# Patient Record
Sex: FEMALE | Race: WHITE | Employment: OTHER | URBAN - METROPOLITAN AREA
[De-identification: names, ages, dates, MRNs, and addresses within clinical notes are randomized per-mention and may not be internally consistent; named-entity substitution may affect disease eponyms.]

---

## 2017-03-10 ENCOUNTER — TELEPHONE (OUTPATIENT)
Dept: INTERNAL MEDICINE CLINIC | Age: 67
End: 2017-03-10

## 2017-08-22 RX ORDER — FAMOTIDINE 40 MG/1
40 TABLET, FILM COATED ORAL 2 TIMES DAILY
Qty: 60 TABLET | Refills: 3 | Status: SHIPPED | OUTPATIENT
Start: 2017-08-22 | End: 2017-08-25 | Stop reason: ALTCHOICE

## 2017-08-25 ENCOUNTER — OFFICE VISIT (OUTPATIENT)
Dept: INTERNAL MEDICINE CLINIC | Age: 67
End: 2017-08-25

## 2017-08-25 VITALS
HEART RATE: 83 BPM | TEMPERATURE: 98 F | SYSTOLIC BLOOD PRESSURE: 138 MMHG | WEIGHT: 149 LBS | DIASTOLIC BLOOD PRESSURE: 84 MMHG | BODY MASS INDEX: 28.15 KG/M2 | OXYGEN SATURATION: 97 %

## 2017-08-25 DIAGNOSIS — K21.9 GASTROESOPHAGEAL REFLUX DISEASE WITHOUT ESOPHAGITIS: ICD-10-CM

## 2017-08-25 DIAGNOSIS — R07.9 CHEST PAIN OF UNKNOWN ETIOLOGY: Primary | ICD-10-CM

## 2017-08-25 PROCEDURE — 99214 OFFICE O/P EST MOD 30 MIN: CPT | Performed by: INTERNAL MEDICINE

## 2017-08-25 PROCEDURE — 93000 ELECTROCARDIOGRAM COMPLETE: CPT | Performed by: INTERNAL MEDICINE

## 2017-08-25 RX ORDER — OMEPRAZOLE 20 MG/1
20 CAPSULE, DELAYED RELEASE ORAL DAILY
Qty: 30 CAPSULE | Refills: 3 | Status: SHIPPED | OUTPATIENT
Start: 2017-08-25 | End: 2017-11-17 | Stop reason: SDUPTHER

## 2017-08-25 ASSESSMENT — ENCOUNTER SYMPTOMS
DIARRHEA: 0
WHEEZING: 0
ABDOMINAL PAIN: 0
SHORTNESS OF BREATH: 0
VOMITING: 0
TROUBLE SWALLOWING: 0
NAUSEA: 0
SORE THROAT: 0

## 2017-10-11 RX ORDER — FAMOTIDINE 40 MG/1
40 TABLET, FILM COATED ORAL 2 TIMES DAILY
Qty: 180 TABLET | Refills: 3 | Status: SHIPPED | OUTPATIENT
Start: 2017-10-11 | End: 2017-10-12 | Stop reason: SDUPTHER

## 2017-10-12 RX ORDER — FAMOTIDINE 40 MG/1
40 TABLET, FILM COATED ORAL 2 TIMES DAILY
Qty: 180 TABLET | Refills: 3 | Status: SHIPPED | OUTPATIENT
Start: 2017-10-12 | End: 2017-11-17

## 2017-11-17 ENCOUNTER — OFFICE VISIT (OUTPATIENT)
Dept: INTERNAL MEDICINE CLINIC | Age: 67
End: 2017-11-17

## 2017-11-17 VITALS
WEIGHT: 153 LBS | HEART RATE: 77 BPM | HEIGHT: 61 IN | TEMPERATURE: 98.3 F | DIASTOLIC BLOOD PRESSURE: 82 MMHG | BODY MASS INDEX: 28.89 KG/M2 | SYSTOLIC BLOOD PRESSURE: 138 MMHG | OXYGEN SATURATION: 97 %

## 2017-11-17 DIAGNOSIS — K21.9 GASTROESOPHAGEAL REFLUX DISEASE WITHOUT ESOPHAGITIS: ICD-10-CM

## 2017-11-17 DIAGNOSIS — G45.4 TRANSIENT GLOBAL AMNESIA: ICD-10-CM

## 2017-11-17 DIAGNOSIS — B35.1 TOENAIL FUNGUS: ICD-10-CM

## 2017-11-17 DIAGNOSIS — G45.4 TRANSIENT GLOBAL AMNESIA: Primary | ICD-10-CM

## 2017-11-17 LAB
ALBUMIN SERPL-MCNC: 4.8 G/DL (ref 3.4–5)
ALP BLD-CCNC: 74 U/L (ref 40–129)
ALT SERPL-CCNC: 16 U/L (ref 10–40)
ANION GAP SERPL CALCULATED.3IONS-SCNC: 12 MMOL/L (ref 3–16)
AST SERPL-CCNC: 18 U/L (ref 15–37)
BASOPHILS ABSOLUTE: 0.1 K/UL (ref 0–0.2)
BASOPHILS RELATIVE PERCENT: 1.1 %
BILIRUB SERPL-MCNC: <0.2 MG/DL (ref 0–1)
BILIRUBIN DIRECT: <0.2 MG/DL (ref 0–0.3)
BILIRUBIN, INDIRECT: NORMAL MG/DL (ref 0–1)
BUN BLDV-MCNC: 24 MG/DL (ref 7–20)
CALCIUM SERPL-MCNC: 9.6 MG/DL (ref 8.3–10.6)
CHLORIDE BLD-SCNC: 102 MMOL/L (ref 99–110)
CO2: 27 MMOL/L (ref 21–32)
CREAT SERPL-MCNC: 1.1 MG/DL (ref 0.6–1.2)
EOSINOPHILS ABSOLUTE: 0.5 K/UL (ref 0–0.6)
EOSINOPHILS RELATIVE PERCENT: 6.2 %
GFR AFRICAN AMERICAN: 60
GFR NON-AFRICAN AMERICAN: 49
GLUCOSE BLD-MCNC: 85 MG/DL (ref 70–99)
HCT VFR BLD CALC: 44.3 % (ref 36–48)
HEMOGLOBIN: 14.9 G/DL (ref 12–16)
LYMPHOCYTES ABSOLUTE: 2.3 K/UL (ref 1–5.1)
LYMPHOCYTES RELATIVE PERCENT: 28.4 %
MCH RBC QN AUTO: 29.7 PG (ref 26–34)
MCHC RBC AUTO-ENTMCNC: 33.6 G/DL (ref 31–36)
MCV RBC AUTO: 88.4 FL (ref 80–100)
MONOCYTES ABSOLUTE: 0.8 K/UL (ref 0–1.3)
MONOCYTES RELATIVE PERCENT: 9.5 %
NEUTROPHILS ABSOLUTE: 4.4 K/UL (ref 1.7–7.7)
NEUTROPHILS RELATIVE PERCENT: 54.8 %
PDW BLD-RTO: 13.7 % (ref 12.4–15.4)
PHOSPHORUS: 4 MG/DL (ref 2.5–4.9)
PLATELET # BLD: 237 K/UL (ref 135–450)
PMV BLD AUTO: 9.1 FL (ref 5–10.5)
POTASSIUM SERPL-SCNC: 4.6 MMOL/L (ref 3.5–5.1)
RBC # BLD: 5.02 M/UL (ref 4–5.2)
SODIUM BLD-SCNC: 141 MMOL/L (ref 136–145)
T4 FREE: 1 NG/DL (ref 0.9–1.8)
TOTAL PROTEIN: 7.4 G/DL (ref 6.4–8.2)
TSH REFLEX: 6.39 UIU/ML (ref 0.27–4.2)
VITAMIN D 25-HYDROXY: 25 NG/ML
WBC # BLD: 8.1 K/UL (ref 4–11)

## 2017-11-17 PROCEDURE — 99213 OFFICE O/P EST LOW 20 MIN: CPT | Performed by: INTERNAL MEDICINE

## 2017-11-17 RX ORDER — CICLOPIROX 7.7 MG/G
GEL TOPICAL
Qty: 1 TUBE | Refills: 0 | Status: SHIPPED | OUTPATIENT
Start: 2017-11-17 | End: 2019-05-03 | Stop reason: ALTCHOICE

## 2017-11-17 RX ORDER — OMEPRAZOLE 20 MG/1
20 CAPSULE, DELAYED RELEASE ORAL DAILY
Qty: 90 CAPSULE | Refills: 3 | Status: SHIPPED | OUTPATIENT
Start: 2017-11-17 | End: 2018-04-18 | Stop reason: SDUPTHER

## 2017-11-17 ASSESSMENT — ENCOUNTER SYMPTOMS
DIARRHEA: 0
VOMITING: 0
SHORTNESS OF BREATH: 0
NAUSEA: 0
SORE THROAT: 0
WHEEZING: 0
ABDOMINAL PAIN: 0
TROUBLE SWALLOWING: 0

## 2017-11-17 NOTE — PROGRESS NOTES
Department of Internal Medicine  Clinic Note    Date: 11/17/2017                                               Subjective/Objective:     Chief Complaint   Patient presents with    Follow-Up from Hospital     transglobal amnesia 1 mo ago, tx at [facility]. Sleeps poorly, doesnot feel all there mentally. HPI: Patient presents today for follow-up evaluation of transient global amnesia she suffered last month. Patient was at home when she suddenly became confused and unable to converse appropriately with her . She was taken to [facility] for evaluation. Her MRI and lab work were not concerning for any specific etiology. I'm not convinced that the patient did not have a TIA at that time. We will order some labs today to evaluate the patient further         Patient Active Problem List    Diagnosis Date Noted    Seborrheic keratosis 10/26/2016    Post-menopausal osteoporosis 02/22/2016    Pre-op exam 09/29/2015    Cataract 09/29/2015    Esophageal ulcer 09/29/2015    Need for pneumococcal vaccination 01/26/2015    Tinea corporis 06/11/2013    Annual physical exam 11/23/2012    Need for Tdap vaccination 11/23/2012    Hypercholesterolemia 11/08/2011    Chronic rhinitis 11/08/2011    Mild persistent asthma without complication 83/90/2124    Insomnia 11/08/2011       No past medical history on file.     Past Surgical History:   Procedure Laterality Date    CATARACT REMOVAL WITH IMPLANT Right 10/2015    Dr. Rosana Stoner       Orders Only on 10/26/2016   Component Date Value Ref Range Status    T4 Free 10/26/2016 1.1  0.9 - 1.8 ng/dL Final       Family History   Problem Relation Age of Onset    Heart Attack Mother     Diabetes Mother     Heart Attack Father     Heart Attack Brother        Current Outpatient Prescriptions   Medication Sig Dispense Refill    Ciclopirox (LOPROX) 0.77 % gel To affected toe nightly 1 Tube 0    omeprazole (PRILOSEC) 20 MG delayed release capsule Take 1 capsule by mouth daily

## 2017-11-20 RX ORDER — MELATONIN
1000 DAILY
Qty: 30 TABLET | Refills: 0 | Status: SHIPPED | OUTPATIENT
Start: 2017-11-20 | End: 2017-12-13 | Stop reason: SDUPTHER

## 2017-12-13 DIAGNOSIS — E78.00 HYPERCHOLESTEROLEMIA: ICD-10-CM

## 2017-12-13 RX ORDER — PRAVASTATIN SODIUM 40 MG
40 TABLET ORAL EVERY EVENING
Qty: 90 TABLET | Refills: 3 | Status: SHIPPED | OUTPATIENT
Start: 2017-12-13 | End: 2018-04-19 | Stop reason: SDUPTHER

## 2018-03-01 DIAGNOSIS — F51.01 PRIMARY INSOMNIA: ICD-10-CM

## 2018-03-01 RX ORDER — HYDROXYZINE PAMOATE 25 MG/1
CAPSULE ORAL
Qty: 120 CAPSULE | Refills: 3 | Status: SHIPPED | OUTPATIENT
Start: 2018-03-01 | End: 2018-04-18 | Stop reason: SDUPTHER

## 2018-04-18 DIAGNOSIS — F51.01 PRIMARY INSOMNIA: ICD-10-CM

## 2018-04-18 DIAGNOSIS — K21.9 GASTROESOPHAGEAL REFLUX DISEASE WITHOUT ESOPHAGITIS: ICD-10-CM

## 2018-04-18 RX ORDER — HYDROXYZINE PAMOATE 25 MG/1
CAPSULE ORAL
Qty: 120 CAPSULE | Refills: 3 | Status: SHIPPED | OUTPATIENT
Start: 2018-04-18 | End: 2018-04-23 | Stop reason: SDUPTHER

## 2018-04-18 RX ORDER — OMEPRAZOLE 20 MG/1
20 CAPSULE, DELAYED RELEASE ORAL DAILY
Qty: 90 CAPSULE | Refills: 3 | Status: SHIPPED | OUTPATIENT
Start: 2018-04-18 | End: 2018-04-23 | Stop reason: SDUPTHER

## 2018-04-19 DIAGNOSIS — E78.00 HYPERCHOLESTEROLEMIA: ICD-10-CM

## 2018-04-19 RX ORDER — PRAVASTATIN SODIUM 40 MG
40 TABLET ORAL EVERY EVENING
Qty: 90 TABLET | Refills: 3 | Status: SHIPPED | OUTPATIENT
Start: 2018-04-19 | End: 2018-04-23 | Stop reason: SDUPTHER

## 2018-04-23 DIAGNOSIS — F51.01 PRIMARY INSOMNIA: ICD-10-CM

## 2018-04-23 DIAGNOSIS — K21.9 GASTROESOPHAGEAL REFLUX DISEASE WITHOUT ESOPHAGITIS: ICD-10-CM

## 2018-04-23 DIAGNOSIS — E78.00 HYPERCHOLESTEROLEMIA: ICD-10-CM

## 2018-04-23 RX ORDER — OMEPRAZOLE 20 MG/1
20 CAPSULE, DELAYED RELEASE ORAL DAILY
Qty: 90 CAPSULE | Refills: 3 | Status: SHIPPED | OUTPATIENT
Start: 2018-04-23 | End: 2019-04-18 | Stop reason: SDUPTHER

## 2018-04-23 RX ORDER — HYDROXYZINE PAMOATE 25 MG/1
CAPSULE ORAL
Qty: 120 CAPSULE | Refills: 3 | Status: SHIPPED | OUTPATIENT
Start: 2018-04-23 | End: 2018-07-03 | Stop reason: SDUPTHER

## 2018-04-23 RX ORDER — PRAVASTATIN SODIUM 40 MG
40 TABLET ORAL EVERY EVENING
Qty: 90 TABLET | Refills: 3 | Status: SHIPPED | OUTPATIENT
Start: 2018-04-23 | End: 2019-05-03 | Stop reason: SDUPTHER

## 2018-04-27 ENCOUNTER — TELEPHONE (OUTPATIENT)
Dept: INTERNAL MEDICINE CLINIC | Age: 68
End: 2018-04-27

## 2018-04-27 RX ORDER — ONDANSETRON 4 MG/1
4 TABLET, FILM COATED ORAL EVERY 12 HOURS PRN
Qty: 20 TABLET | Refills: 0 | Status: SHIPPED | OUTPATIENT
Start: 2018-04-27 | End: 2019-05-03 | Stop reason: ALTCHOICE

## 2018-06-14 ENCOUNTER — TELEPHONE (OUTPATIENT)
Dept: INTERNAL MEDICINE CLINIC | Age: 68
End: 2018-06-14

## 2018-06-22 ENCOUNTER — TELEPHONE (OUTPATIENT)
Dept: INTERNAL MEDICINE CLINIC | Age: 68
End: 2018-06-22

## 2018-06-22 ENCOUNTER — TELEPHONE (OUTPATIENT)
Dept: PRIMARY CARE CLINIC | Age: 68
End: 2018-06-22

## 2018-07-03 DIAGNOSIS — F51.01 PRIMARY INSOMNIA: ICD-10-CM

## 2018-07-03 RX ORDER — HYDROXYZINE PAMOATE 25 MG/1
CAPSULE ORAL
Qty: 120 CAPSULE | Refills: 3 | Status: SHIPPED | OUTPATIENT
Start: 2018-07-03 | End: 2019-05-03 | Stop reason: SDUPTHER

## 2018-08-07 ENCOUNTER — TELEPHONE (OUTPATIENT)
Dept: INTERNAL MEDICINE CLINIC | Age: 68
End: 2018-08-07

## 2018-08-07 NOTE — TELEPHONE ENCOUNTER
Pt is asking for a script of med below. She would like a hard copy mailed to her address is on file.  She is aware Dr Wilkins Hashimoto is off  Today       JK#[phone]      ADVAIR DISKUS 100-50 MCG/DOSE diskus inhaler 1 Inhaler 5 4/26/2018     Sig - Route: INHALE 1 PUFF INTO THE LUNGS 2 TIMES DAILY - Inhalation

## 2019-05-03 DIAGNOSIS — E55.9 VITAMIN D DEFICIENCY: ICD-10-CM

## 2019-05-03 DIAGNOSIS — R73.9 HYPERGLYCEMIA: ICD-10-CM

## 2019-05-03 DIAGNOSIS — E78.2 MIXED HYPERLIPIDEMIA: ICD-10-CM

## 2019-05-03 DIAGNOSIS — E78.00 HYPERCHOLESTEROLEMIA: ICD-10-CM

## 2019-05-03 DIAGNOSIS — F51.01 PRIMARY INSOMNIA: ICD-10-CM

## 2019-05-03 LAB
ALBUMIN SERPL-MCNC: 4.6 G/DL (ref 3.4–5)
ALP BLD-CCNC: 77 U/L (ref 40–129)
ALT SERPL-CCNC: 14 U/L (ref 10–40)
ANION GAP SERPL CALCULATED.3IONS-SCNC: 14 MMOL/L (ref 3–16)
AST SERPL-CCNC: 18 U/L (ref 15–37)
BASOPHILS ABSOLUTE: 0.1 K/UL (ref 0–0.2)
BASOPHILS RELATIVE PERCENT: 0.9 %
BILIRUB SERPL-MCNC: 0.4 MG/DL (ref 0–1)
BILIRUBIN DIRECT: <0.2 MG/DL (ref 0–0.3)
BILIRUBIN, INDIRECT: NORMAL MG/DL (ref 0–1)
BUN BLDV-MCNC: 20 MG/DL (ref 7–20)
CALCIUM SERPL-MCNC: 9.8 MG/DL (ref 8.3–10.6)
CHLORIDE BLD-SCNC: 103 MMOL/L (ref 99–110)
CHOLESTEROL, TOTAL: 236 MG/DL (ref 0–199)
CO2: 26 MMOL/L (ref 21–32)
CREAT SERPL-MCNC: 1.3 MG/DL (ref 0.6–1.2)
EOSINOPHILS ABSOLUTE: 0.4 K/UL (ref 0–0.6)
EOSINOPHILS RELATIVE PERCENT: 5.2 %
GFR AFRICAN AMERICAN: 49
GFR NON-AFRICAN AMERICAN: 41
GLUCOSE BLD-MCNC: 102 MG/DL (ref 70–99)
HCT VFR BLD CALC: 47.5 % (ref 36–48)
HDLC SERPL-MCNC: 63 MG/DL (ref 40–60)
HEMOGLOBIN: 15.8 G/DL (ref 12–16)
LDL CHOLESTEROL CALCULATED: 140 MG/DL
LYMPHOCYTES ABSOLUTE: 1.6 K/UL (ref 1–5.1)
LYMPHOCYTES RELATIVE PERCENT: 21.7 %
MCH RBC QN AUTO: 29.4 PG (ref 26–34)
MCHC RBC AUTO-ENTMCNC: 33.2 G/DL (ref 31–36)
MCV RBC AUTO: 88.7 FL (ref 80–100)
MONOCYTES ABSOLUTE: 0.6 K/UL (ref 0–1.3)
MONOCYTES RELATIVE PERCENT: 8.7 %
NEUTROPHILS ABSOLUTE: 4.7 K/UL (ref 1.7–7.7)
NEUTROPHILS RELATIVE PERCENT: 63.5 %
PDW BLD-RTO: 13.3 % (ref 12.4–15.4)
PHOSPHORUS: 3.7 MG/DL (ref 2.5–4.9)
PLATELET # BLD: 247 K/UL (ref 135–450)
PMV BLD AUTO: 8.4 FL (ref 5–10.5)
POTASSIUM SERPL-SCNC: 4.6 MMOL/L (ref 3.5–5.1)
RBC # BLD: 5.36 M/UL (ref 4–5.2)
SODIUM BLD-SCNC: 143 MMOL/L (ref 136–145)
T4 FREE: 1 NG/DL (ref 0.9–1.8)
TOTAL PROTEIN: 7.9 G/DL (ref 6.4–8.2)
TRIGL SERPL-MCNC: 165 MG/DL (ref 0–150)
TSH REFLEX: 7.12 UIU/ML (ref 0.27–4.2)
VITAMIN D 25-HYDROXY: 25.2 NG/ML
VLDLC SERPL CALC-MCNC: 33 MG/DL
WBC # BLD: 7.3 K/UL (ref 4–11)

## 2019-05-04 LAB
ESTIMATED AVERAGE GLUCOSE: 137 MG/DL
HBA1C MFR BLD: 6.4 %

## 2021-06-18 DIAGNOSIS — E55.9 VITAMIN D DEFICIENCY: ICD-10-CM

## 2021-06-18 DIAGNOSIS — R73.03 PREDIABETES: ICD-10-CM

## 2021-06-18 DIAGNOSIS — E03.8 SUBCLINICAL HYPOTHYROIDISM: ICD-10-CM

## 2021-06-18 DIAGNOSIS — N18.31 STAGE 3A CHRONIC KIDNEY DISEASE (HCC): ICD-10-CM

## 2021-06-18 DIAGNOSIS — E78.00 HYPERCHOLESTEROLEMIA: ICD-10-CM

## 2021-06-18 LAB
A/G RATIO: 1.5 (ref 1.1–2.2)
ALBUMIN SERPL-MCNC: 4.7 G/DL (ref 3.4–5)
ALP BLD-CCNC: 74 U/L (ref 40–129)
ALT SERPL-CCNC: 13 U/L (ref 10–40)
ANION GAP SERPL CALCULATED.3IONS-SCNC: 12 MMOL/L (ref 3–16)
AST SERPL-CCNC: 25 U/L (ref 15–37)
BASOPHILS ABSOLUTE: 0.1 K/UL (ref 0–0.2)
BASOPHILS RELATIVE PERCENT: 1.3 %
BILIRUB SERPL-MCNC: 0.3 MG/DL (ref 0–1)
BUN BLDV-MCNC: 22 MG/DL (ref 7–20)
CALCIUM SERPL-MCNC: 9.7 MG/DL (ref 8.3–10.6)
CHLORIDE BLD-SCNC: 105 MMOL/L (ref 99–110)
CHOLESTEROL, TOTAL: 317 MG/DL (ref 0–199)
CO2: 25 MMOL/L (ref 21–32)
CREAT SERPL-MCNC: 1.2 MG/DL (ref 0.6–1.2)
EOSINOPHILS ABSOLUTE: 0.5 K/UL (ref 0–0.6)
EOSINOPHILS RELATIVE PERCENT: 6.5 %
GFR AFRICAN AMERICAN: 54
GFR NON-AFRICAN AMERICAN: 44
GLOBULIN: 3.2 G/DL
GLUCOSE BLD-MCNC: 75 MG/DL (ref 70–99)
HCT VFR BLD CALC: 44.2 % (ref 36–48)
HDLC SERPL-MCNC: 57 MG/DL (ref 40–60)
HEMOGLOBIN: 15 G/DL (ref 12–16)
LDL CHOLESTEROL CALCULATED: 202 MG/DL
LYMPHOCYTES ABSOLUTE: 2 K/UL (ref 1–5.1)
LYMPHOCYTES RELATIVE PERCENT: 27.6 %
MCH RBC QN AUTO: 29.6 PG (ref 26–34)
MCHC RBC AUTO-ENTMCNC: 33.9 G/DL (ref 31–36)
MCV RBC AUTO: 87.1 FL (ref 80–100)
MONOCYTES ABSOLUTE: 0.8 K/UL (ref 0–1.3)
MONOCYTES RELATIVE PERCENT: 10.7 %
NEUTROPHILS ABSOLUTE: 3.9 K/UL (ref 1.7–7.7)
NEUTROPHILS RELATIVE PERCENT: 53.9 %
PDW BLD-RTO: 14.1 % (ref 12.4–15.4)
PLATELET # BLD: 250 K/UL (ref 135–450)
PMV BLD AUTO: 8.4 FL (ref 5–10.5)
POTASSIUM SERPL-SCNC: 4.4 MMOL/L (ref 3.5–5.1)
RBC # BLD: 5.07 M/UL (ref 4–5.2)
SODIUM BLD-SCNC: 142 MMOL/L (ref 136–145)
T4 FREE: 0.9 NG/DL (ref 0.9–1.8)
TOTAL PROTEIN: 7.9 G/DL (ref 6.4–8.2)
TRIGL SERPL-MCNC: 290 MG/DL (ref 0–150)
TSH REFLEX FT4: 8.13 UIU/ML (ref 0.27–4.2)
VITAMIN D 25-HYDROXY: 54.1 NG/ML
VLDLC SERPL CALC-MCNC: 58 MG/DL
WBC # BLD: 7.3 K/UL (ref 4–11)

## 2021-06-19 LAB
ESTIMATED AVERAGE GLUCOSE: 116.9 MG/DL
HBA1C MFR BLD: 5.7 %

## 2021-08-03 DIAGNOSIS — E78.00 HYPERCHOLESTEROLEMIA: ICD-10-CM

## 2021-08-03 DIAGNOSIS — E03.8 SUBCLINICAL HYPOTHYROIDISM: ICD-10-CM

## 2021-08-03 LAB
CHOLESTEROL, TOTAL: 264 MG/DL (ref 0–199)
HDLC SERPL-MCNC: 58 MG/DL (ref 40–60)
LDL CHOLESTEROL CALCULATED: 170 MG/DL
T4 FREE: 1.2 NG/DL (ref 0.9–1.8)
TRIGL SERPL-MCNC: 182 MG/DL (ref 0–150)
TSH REFLEX FT4: 5.82 UIU/ML (ref 0.27–4.2)
VLDLC SERPL CALC-MCNC: 36 MG/DL

## 2021-12-03 ENCOUNTER — OFFICE VISIT (OUTPATIENT)
Dept: INTERNAL MEDICINE CLINIC | Age: 71
End: 2021-12-03
Payer: MEDICARE

## 2021-12-03 VITALS
BODY MASS INDEX: 27.42 KG/M2 | SYSTOLIC BLOOD PRESSURE: 138 MMHG | OXYGEN SATURATION: 96 % | WEIGHT: 149 LBS | HEART RATE: 83 BPM | DIASTOLIC BLOOD PRESSURE: 89 MMHG | HEIGHT: 62 IN

## 2021-12-03 DIAGNOSIS — J45.30 MILD PERSISTENT ASTHMA WITHOUT COMPLICATION: Primary | ICD-10-CM

## 2021-12-03 DIAGNOSIS — E03.8 SUBCLINICAL HYPOTHYROIDISM: ICD-10-CM

## 2021-12-03 DIAGNOSIS — Z12.31 SCREENING MAMMOGRAM FOR BREAST CANCER: ICD-10-CM

## 2021-12-03 PROBLEM — H26.491 POSTERIOR CAPSULAR OPACIFICATION, RIGHT: Status: ACTIVE | Noted: 2021-06-29

## 2021-12-03 PROCEDURE — 4040F PNEUMOC VAC/ADMIN/RCVD: CPT | Performed by: FAMILY MEDICINE

## 2021-12-03 PROCEDURE — G8400 PT W/DXA NO RESULTS DOC: HCPCS | Performed by: FAMILY MEDICINE

## 2021-12-03 PROCEDURE — G8417 CALC BMI ABV UP PARAM F/U: HCPCS | Performed by: FAMILY MEDICINE

## 2021-12-03 PROCEDURE — 1123F ACP DISCUSS/DSCN MKR DOCD: CPT | Performed by: FAMILY MEDICINE

## 2021-12-03 PROCEDURE — 1036F TOBACCO NON-USER: CPT | Performed by: FAMILY MEDICINE

## 2021-12-03 PROCEDURE — G8482 FLU IMMUNIZE ORDER/ADMIN: HCPCS | Performed by: FAMILY MEDICINE

## 2021-12-03 PROCEDURE — 3017F COLORECTAL CA SCREEN DOC REV: CPT | Performed by: FAMILY MEDICINE

## 2021-12-03 PROCEDURE — 1090F PRES/ABSN URINE INCON ASSESS: CPT | Performed by: FAMILY MEDICINE

## 2021-12-03 PROCEDURE — G8427 DOCREV CUR MEDS BY ELIG CLIN: HCPCS | Performed by: FAMILY MEDICINE

## 2021-12-03 PROCEDURE — 99204 OFFICE O/P NEW MOD 45 MIN: CPT | Performed by: FAMILY MEDICINE

## 2021-12-03 RX ORDER — LEVOTHYROXINE SODIUM 0.05 MG/1
50 TABLET ORAL DAILY
Qty: 30 TABLET | Refills: 5 | Status: SHIPPED | OUTPATIENT
Start: 2021-12-03 | End: 2022-05-09 | Stop reason: ALTCHOICE

## 2021-12-03 ASSESSMENT — ENCOUNTER SYMPTOMS
SHORTNESS OF BREATH: 0
DIARRHEA: 0
CONSTIPATION: 0
COUGH: 0
VOMITING: 0
NAUSEA: 0

## 2021-12-03 NOTE — PROGRESS NOTES
Martine Canales (:  1950) is a 69 y.o. female,New patient, here for evaluation of the following chief complaint(s):  New Patient (wants paper script for advair only. )      SUBJECTIVE:  Asthma-currently using Advair and albuterol. mild shortness of breath with exercising but improved with albuterol prn     Vit D deficiency- daily vitamin D supplement      HLD-   Last lipid panel in 2020: , Trig 178, HDL 61, Total 329. She has been unable to tolerate statins. (Pravastatin gave her insomnia, atorvastatin gave her acid reflux). She admits to being averse to medications and worries about side effects.      GERD-omeprazole 20 mg daily     CKD3- baseline Scr 1.1. Has some urinary frequency but otherwise no  complaints     Pre-diabetes- hemoglobin a1c of 6.4 in May 2019.  She is very averse to taking medications.     Subclinical hypothyroidism - last TSH 7.97 as of 2020. C/O some generalized brain fog but otherwise denies constipation, skin dryness, hair loss, weight gain         Working at [employer] part time, thinks more fatigued from this. Has to get up and urinate multiple times per night, at least once but then gets up for the dog too. I have reviewed the chart notes available from myself and other providers. I have reviewed and addressed all active problems and created or updated the problems list in detail, as needed    I have extensively reviewed and reconciled the medication list, discontinued medications not taking or no longer appropriate, and updated the active meds list    OBJECTIVE:  Review of Systems   Constitutional: Negative for chills and fever. Respiratory: Negative for cough and shortness of breath. Cardiovascular: Negative for leg swelling. Gastrointestinal: Negative for constipation, diarrhea, nausea and vomiting. Endocrine: Negative for polyuria. Genitourinary: Negative for frequency. Skin: Negative for rash.        Vitals:    21 1052   BP: 138/89   Pulse: 83   SpO2: 96%   Weight: 149 lb (67.6 kg)   Height: 5' 2\" (1.575 m)      Body mass index is 27.25 kg/m². Physical Exam  Constitutional:       Appearance: Normal appearance. Cardiovascular:      Rate and Rhythm: Normal rate and regular rhythm. Pulses: Normal pulses. Heart sounds: Normal heart sounds. Pulmonary:      Effort: Pulmonary effort is normal.      Breath sounds: Normal breath sounds. Musculoskeletal:      Right lower leg: No edema. Left lower leg: No edema. Neurological:      General: No focal deficit present. Mental Status: She is alert. Mental status is at baseline.            Lab Results   Component Value Date    LABA1C 5.7 06/18/2021     Lab Results   Component Value Date    WBC 7.3 06/18/2021    HGB 15.0 06/18/2021    HCT 44.2 06/18/2021    MCV 87.1 06/18/2021     06/18/2021      Lab Results   Component Value Date     06/18/2021    K 4.4 06/18/2021     06/18/2021    CO2 25 06/18/2021    BUN 22 06/18/2021    CREATININE 1.2 06/18/2021    GLUCOSE 75 06/18/2021    CALCIUM 9.7 06/18/2021       Lab Results   Component Value Date    CHOL 264 (H) 08/03/2021    CHOL 317 (H) 06/18/2021    CHOL 329 (H) 06/19/2020     Lab Results   Component Value Date    TRIG 182 (H) 08/03/2021    TRIG 290 (H) 06/18/2021    TRIG 178 (H) 06/19/2020     Lab Results   Component Value Date    HDL 58 08/03/2021    HDL 57 06/18/2021    HDL 61 (H) 06/19/2020     Lab Results   Component Value Date    LDLCALC 170 (H) 08/03/2021    LDLCALC 202 (H) 06/18/2021    LDLCALC 232 (H) 06/19/2020     Lab Results   Component Value Date    LABVLDL 36 08/03/2021    LABVLDL 58 06/18/2021    LABVLDL 36 06/19/2020     No results found for: CHOLHDLRATIO     The 10-year ASCVD risk score (Melody Lau et al., 2013) is: 12.9%    Values used to calculate the score:      Age: 69 years      Sex: Female      Is Non- : No      Diabetic: No      Tobacco smoker: No Systolic Blood Pressure: 652 mmHg      Is BP treated: No      HDL Cholesterol: 58 mg/dL      Total Cholesterol: 264 mg/dL     Patient received counseling and, if relevant, printed instructions for all symptoms listed in CC and HPI, as well as for all diagnoses brought onto today's visit note below. Typical counseling includes, but is not limited to, non-pharmacologic measures to manage listed symptoms and conditions; appropriate use, risks and benefits for all prescribed medications; potential interactions between medications both prescribed and OTC; diet; exercise; healthy behaviors; and goalsetting to improve health. Patient or responsible party was involved in shared decision making and had opportunity to have all questions answered. Except as noted below, all chronic problems have been reviewed and are stable to continue medications or other therapy as previously documented in the patient's chart, with changes per orders or documentation below:    1. Mild persistent asthma without complication  -     fluticasone-salmeterol (ADVAIR DISKUS) 100-50 MCG/DOSE diskus inhaler; Inhale 1 puff into the lungs 2 times daily, Disp-1 each, R-5Print  2. Subclinical hypothyroidism  -     levothyroxine (SYNTHROID) 50 MCG tablet; Take 1 tablet by mouth daily, Disp-30 tablet, R-5Normal  3. Screening mammogram for breast cancer  -     [facility] DIGITAL SCREENING AUGMENTED BILATERAL;  Future          Problem List        Unprioritized    Mild persistent asthma without complication - Primary (Chronic)    Relevant Medications    albuterol sulfate HFA (VENTOLIN HFA) 108 (90 Base) MCG/ACT inhaler    fluticasone-salmeterol (ADVAIR DISKUS) 100-50 MCG/DOSE diskus inhaler        Orders Placed This Encounter   Procedures    [facility] DIGITAL SCREENING AUGMENTED BILATERAL     Standing Status:   Future     Standing Expiration Date:   2/3/2023     Order Specific Question:   Reason for exam:     Answer:   screen for br ca       Return in about 5 months (around 5/3/2022) for AWV, Friday works best.          [facility] - Internal Medicine and Pediatrics  Dr. Jamey Barajas D.O.  - Family Medicine and T      Usual doctor's hours are:     Monday 7:30 am to 6:00 pm           Tuesday  7:30 am to 5:00 pm                                               Wednesday 7:30 am to 5:00 pm                                               Thursday 7:30 am to 5:00 pm                                               Friday 7:30 am to 4:00 pm           Saturdays, Sundays, and after hours: E-Visits are available    We observe most federal holidays and Good Friday. We ask that you only contact the office one time per issue or question, and please allow one full business day for a call back. Calling us back multiple times keeps us from being able to complete the work efficiently for you and our other patients. For medication renewals, please call your pharmacist to contact us, and be sure to allow at least 3 business days for processing before you need to  your medication. If you are sick or need an appointment that hasn't been planned, same day appointments are available every day the office is open: Monday, Tuesday, Wednesday, Thursday, and Friday. Call during office hours to schedule, even if it may not be with your regular physician. You may also call the office after 8 am on office days if you need to be seen from an issue the night before. During hours when the office is not normally open, your call will go to the messaging service which cannot provide any service other than paging the doctor. No prescriptions or other nonurgent matters will be handled and no voicemail is available, so please call back during office hours for these matters.        Electronically signed by Jamey Barajas DO on 12/3/2021 at 11:21 AM.

## 2022-01-10 DIAGNOSIS — E78.00 HYPERCHOLESTEROLEMIA: ICD-10-CM

## 2022-01-10 RX ORDER — EZETIMIBE 10 MG/1
10 TABLET ORAL DAILY
Qty: 90 TABLET | Refills: 1 | Status: SHIPPED | OUTPATIENT
Start: 2022-01-10 | End: 2022-07-25 | Stop reason: SDUPTHER

## 2022-01-10 NOTE — TELEPHONE ENCOUNTER
ROS:  Constitutional: no weight loss, fever, night sweats  Eyes: negative  Ears/Nose/Throat/Mouth: negative  Respiratory: negative  Cardiovascular: negative  Gastrointestinal: negative  Skin: negative  Musculoskeletal: negative  Neurological: negative  Endocrine:  negative  Hematologic/Lymphatic: negative  Psychologic: negative Requested Prescriptions     Pending Prescriptions Disp Refills    ezetimibe (ZETIA) 10 MG tablet 90 tablet 1     Sig: Take 1 tablet by mouth daily       Patient requesting a medication refill.   Last filled on: n/a  [facility]  Next office visit: 5/4/2022  Last regular office visit: 12/3/2021

## 2022-03-11 ENCOUNTER — HOSPITAL ENCOUNTER (OUTPATIENT)
Dept: WOMENS IMAGING | Age: 72
Discharge: HOME | End: 2022-03-11
Payer: MEDICARE

## 2022-03-11 DIAGNOSIS — R92.8 ABNORMALITY OF LEFT BREAST ON SCREENING MAMMOGRAM: Primary | ICD-10-CM

## 2022-03-11 DIAGNOSIS — Z12.31 SCREENING MAMMOGRAM FOR BREAST CANCER: ICD-10-CM

## 2022-03-11 PROCEDURE — 77067 SCR MAMMO BI INCL CAD: CPT

## 2022-03-23 ENCOUNTER — TELEPHONE (OUTPATIENT)
Dept: INTERNAL MEDICINE CLINIC | Age: 72
End: 2022-03-23

## 2022-03-23 DIAGNOSIS — F51.01 PRIMARY INSOMNIA: ICD-10-CM

## 2022-03-23 RX ORDER — HYDROXYZINE PAMOATE 25 MG/1
CAPSULE ORAL
Qty: 120 CAPSULE | Refills: 0 | Status: SHIPPED | OUTPATIENT
Start: 2022-03-23 | End: 2022-07-25 | Stop reason: SDUPTHER

## 2022-03-23 NOTE — TELEPHONE ENCOUNTER
Patient needs refill on Hydroxyzine patient uses [facility] [place], [phone] is the best number to reach the patient if there are questions

## 2022-03-23 NOTE — TELEPHONE ENCOUNTER
Requested Prescriptions     Pending Prescriptions Disp Refills    hydrOXYzine (VISTARIL) 25 MG capsule 120 capsule 0     Sig: TAKE ONE CAPSULE BY MOUTH FOUR TIMES A DAY     Patient requesting a medication refill.     Next office visit: 5/2/2022    Last regular office visit: 12/3/2021

## 2022-04-13 ENCOUNTER — HOSPITAL ENCOUNTER (OUTPATIENT)
Dept: WOMENS IMAGING | Age: 72
Discharge: HOME | End: 2022-04-13
Payer: MEDICARE

## 2022-04-13 ENCOUNTER — APPOINTMENT (OUTPATIENT)
Dept: ULTRASOUND IMAGING | Age: 72
End: 2022-04-13
Payer: MEDICARE

## 2022-04-13 DIAGNOSIS — R92.8 ABNORMALITY OF LEFT BREAST ON SCREENING MAMMOGRAM: ICD-10-CM

## 2022-04-13 PROCEDURE — G0279 TOMOSYNTHESIS, MAMMO: HCPCS

## 2022-05-02 ENCOUNTER — OFFICE VISIT (OUTPATIENT)
Dept: INTERNAL MEDICINE CLINIC | Age: 72
End: 2022-05-02
Payer: MEDICARE

## 2022-05-02 VITALS
WEIGHT: 148 LBS | OXYGEN SATURATION: 95 % | HEART RATE: 76 BPM | SYSTOLIC BLOOD PRESSURE: 155 MMHG | DIASTOLIC BLOOD PRESSURE: 90 MMHG | BODY MASS INDEX: 27.07 KG/M2

## 2022-05-02 DIAGNOSIS — E78.00 HYPERCHOLESTEROLEMIA: ICD-10-CM

## 2022-05-02 DIAGNOSIS — L82.1 SEBORRHEIC KERATOSES: ICD-10-CM

## 2022-05-02 DIAGNOSIS — H33.21 RIGHT RETINAL DETACHMENT: Primary | ICD-10-CM

## 2022-05-02 DIAGNOSIS — Z78.9 STATIN INTOLERANCE: ICD-10-CM

## 2022-05-02 DIAGNOSIS — M19.012 ARTHRITIS OF LEFT SHOULDER REGION: ICD-10-CM

## 2022-05-02 DIAGNOSIS — E55.9 VITAMIN D DEFICIENCY: ICD-10-CM

## 2022-05-02 DIAGNOSIS — Z00.00 INITIAL MEDICARE ANNUAL WELLNESS VISIT: Primary | ICD-10-CM

## 2022-05-02 DIAGNOSIS — Z00.00 MEDICARE ANNUAL WELLNESS VISIT, SUBSEQUENT: ICD-10-CM

## 2022-05-02 DIAGNOSIS — R73.03 PREDIABETES: ICD-10-CM

## 2022-05-02 DIAGNOSIS — N18.32 STAGE 3B CHRONIC KIDNEY DISEASE (HCC): ICD-10-CM

## 2022-05-02 DIAGNOSIS — E03.8 SUBCLINICAL HYPOTHYROIDISM: ICD-10-CM

## 2022-05-02 PROBLEM — N18.30 CHRONIC RENAL DISEASE, STAGE III (HCC): Status: ACTIVE | Noted: 2022-05-02

## 2022-05-02 PROBLEM — N18.31 STAGE 3A CHRONIC KIDNEY DISEASE (HCC): Status: ACTIVE | Noted: 2022-05-02

## 2022-05-02 LAB
ANION GAP SERPL CALCULATED.3IONS-SCNC: 12 MMOL/L (ref 3–16)
BUN BLDV-MCNC: 18 MG/DL (ref 7–20)
CALCIUM SERPL-MCNC: 9.7 MG/DL (ref 8.3–10.6)
CHLORIDE BLD-SCNC: 103 MMOL/L (ref 99–110)
CHOLESTEROL, TOTAL: 282 MG/DL (ref 0–199)
CO2: 25 MMOL/L (ref 21–32)
CREAT SERPL-MCNC: 1.2 MG/DL (ref 0.6–1.2)
GFR AFRICAN AMERICAN: 53
GFR NON-AFRICAN AMERICAN: 44
GLUCOSE BLD-MCNC: 94 MG/DL (ref 70–99)
HDLC SERPL-MCNC: 57 MG/DL (ref 40–60)
LDL CHOLESTEROL CALCULATED: 183 MG/DL
POTASSIUM REFLEX MAGNESIUM: 4.4 MMOL/L (ref 3.5–5.1)
SODIUM BLD-SCNC: 140 MMOL/L (ref 136–145)
T4 FREE: 1.3 NG/DL (ref 0.9–1.8)
TRIGL SERPL-MCNC: 210 MG/DL (ref 0–150)
TSH REFLEX FT4: 8.58 UIU/ML (ref 0.27–4.2)
VLDLC SERPL CALC-MCNC: 42 MG/DL

## 2022-05-02 PROCEDURE — G0438 PPPS, INITIAL VISIT: HCPCS | Performed by: FAMILY MEDICINE

## 2022-05-02 PROCEDURE — 1123F ACP DISCUSS/DSCN MKR DOCD: CPT | Performed by: FAMILY MEDICINE

## 2022-05-02 PROCEDURE — 99214 OFFICE O/P EST MOD 30 MIN: CPT | Performed by: FAMILY MEDICINE

## 2022-05-02 PROCEDURE — 3017F COLORECTAL CA SCREEN DOC REV: CPT | Performed by: FAMILY MEDICINE

## 2022-05-02 PROCEDURE — G8427 DOCREV CUR MEDS BY ELIG CLIN: HCPCS | Performed by: FAMILY MEDICINE

## 2022-05-02 PROCEDURE — 4040F PNEUMOC VAC/ADMIN/RCVD: CPT | Performed by: FAMILY MEDICINE

## 2022-05-02 PROCEDURE — 1090F PRES/ABSN URINE INCON ASSESS: CPT | Performed by: FAMILY MEDICINE

## 2022-05-02 PROCEDURE — G8400 PT W/DXA NO RESULTS DOC: HCPCS | Performed by: FAMILY MEDICINE

## 2022-05-02 PROCEDURE — G8417 CALC BMI ABV UP PARAM F/U: HCPCS | Performed by: FAMILY MEDICINE

## 2022-05-02 PROCEDURE — 1036F TOBACCO NON-USER: CPT | Performed by: FAMILY MEDICINE

## 2022-05-02 ASSESSMENT — ENCOUNTER SYMPTOMS
COUGH: 0
SHORTNESS OF BREATH: 0
CONSTIPATION: 0
VOMITING: 0
NAUSEA: 0
DIARRHEA: 0

## 2022-05-02 ASSESSMENT — PATIENT HEALTH QUESTIONNAIRE - PHQ9
1. LITTLE INTEREST OR PLEASURE IN DOING THINGS: 0
2. FEELING DOWN, DEPRESSED OR HOPELESS: 1
SUM OF ALL RESPONSES TO PHQ QUESTIONS 1-9: 1
SUM OF ALL RESPONSES TO PHQ QUESTIONS 1-9: 1
SUM OF ALL RESPONSES TO PHQ9 QUESTIONS 1 & 2: 1
SUM OF ALL RESPONSES TO PHQ QUESTIONS 1-9: 1
SUM OF ALL RESPONSES TO PHQ QUESTIONS 1-9: 1

## 2022-05-02 ASSESSMENT — LIFESTYLE VARIABLES
HOW MANY STANDARD DRINKS CONTAINING ALCOHOL DO YOU HAVE ON A TYPICAL DAY: 1 OR 2
HOW OFTEN DO YOU HAVE A DRINK CONTAINING ALCOHOL: 2-3 TIMES A WEEK

## 2022-05-02 NOTE — PROGRESS NOTES
Seema Canales (:  1950) is a 66 y.o. female,Established patient, here for evaluation of the following chief complaint(s):  Follow-up (5 month )      SUBJECTIVE:  22 -   Went to ophthalmology on 22 -- retinal detachment od (right); macula on emergent referral to retina service -- she ended up having surgery on 22 emergently   Did not need sclera banding, did have to keep head down for 3 days, but so far she has seen them twice between that surgery and now. She is getting to the point where she has the gas bubble still present in her vision, but is able to see a little bit more than before. Not driving. Would like skin referral for skin check -- will refer to dermatology of the [facility].     Does have some left shoulder arthritis, worked as a  for 20 years. Not bothering her enough for daily preventative/treatment, occasional tylenol PRN. Will get medicare annual wellness examination today      21 -- Asthma-currently using Advair and albuterol. mild shortness of breath with exercising but improved with albuterol prn     Vit D deficiency- daily vitamin D supplement      HLD-   Last lipid panel in 2020: , Trig 178, HDL 61, Total 329. She has been unable to tolerate statins. (Pravastatin gave her insomnia, atorvastatin gave her acid reflux). She admits to being averse to medications and worries about side effects.      GERD-omeprazole 20 mg daily     CKD3- baseline Scr 1.1. Has some urinary frequency but otherwise no  complaints     Pre-diabetes- hemoglobin a1c of 6.4 in May 2019.  She is very averse to taking medications.     Subclinical hypothyroidism - last TSH 7.97 as of 2020. C/O some generalized brain fog but otherwise denies constipation, skin dryness, hair loss, weight gain           I have reviewed the chart notes available from myself and other providers.  I have reviewed and addressed all active problems and created or updated the problems list in detail, as needed    I have extensively reviewed and reconciled the medication list, discontinued medications not taking or no longer appropriate, and updated the active meds list    OBJECTIVE:  Review of Systems   Constitutional: Negative for chills and fever. Respiratory: Negative for cough and shortness of breath. Cardiovascular: Negative for leg swelling. Gastrointestinal: Negative for constipation, diarrhea, nausea and vomiting. Endocrine: Negative for polyuria. Genitourinary: Negative for frequency. Skin: Negative for rash. Vitals:    05/02/22 1107   BP: (!) 155/90   Pulse: 76   SpO2: 95%   Weight: 148 lb (67.1 kg)      Body mass index is 27.07 kg/m². Physical Exam  Constitutional:       Appearance: Normal appearance. Cardiovascular:      Rate and Rhythm: Normal rate and regular rhythm. Pulses: Normal pulses. Heart sounds: Normal heart sounds. Pulmonary:      Effort: Pulmonary effort is normal.      Breath sounds: Normal breath sounds. Musculoskeletal:         General: Tenderness: left A/C joint but full ROM. Normal range of motion. Right lower leg: No edema. Left lower leg: No edema. Neurological:      General: No focal deficit present. Mental Status: She is alert. Mental status is at baseline.            Lab Results   Component Value Date    LABA1C 5.7 06/18/2021     Lab Results   Component Value Date    WBC 7.3 06/18/2021    HGB 15.0 06/18/2021    HCT 44.2 06/18/2021    MCV 87.1 06/18/2021     06/18/2021      Lab Results   Component Value Date     06/18/2021    K 4.4 06/18/2021     06/18/2021    CO2 25 06/18/2021    BUN 22 06/18/2021    CREATININE 1.2 06/18/2021    GLUCOSE 75 06/18/2021    CALCIUM 9.7 06/18/2021       Lab Results   Component Value Date    CHOL 264 (H) 08/03/2021    CHOL 317 (H) 06/18/2021    CHOL 329 (H) 06/19/2020     Lab Results   Component Value Date    TRIG 182 (H) 08/03/2021    TRIG 290 (H) 06/18/2021    TRIG 178 (H) 06/19/2020     Lab Results   Component Value Date    HDL 58 08/03/2021    HDL 57 06/18/2021    HDL 61 (H) 06/19/2020     Lab Results   Component Value Date    LDLCALC 170 (H) 08/03/2021    LDLCALC 202 (H) 06/18/2021    LDLCALC 232 (H) 06/19/2020     Lab Results   Component Value Date    LABVLDL 36 08/03/2021    LABVLDL 58 06/18/2021    LABVLDL 36 06/19/2020     No results found for: CHOLHDLRATIO     The 10-year ASCVD risk score (Reese Davis et al., 2013) is: 17.5%    Values used to calculate the score:      Age: 66 years      Sex: Female      Is Non- : No      Diabetic: No      Tobacco smoker: No      Systolic Blood Pressure: 403 mmHg      Is BP treated: No      HDL Cholesterol: 58 mg/dL      Total Cholesterol: 264 mg/dL     Patient received counseling and, if relevant, printed instructions for all symptoms listed in CC and HPI, as well as for all diagnoses brought onto today's visit note below. Typical counseling includes, but is not limited to, non-pharmacologic measures to manage listed symptoms and conditions; appropriate use, risks and benefits for all prescribed medications; potential interactions between medications both prescribed and OTC; diet; exercise; healthy behaviors; and goalsetting to improve health. Patient or responsible party was involved in shared decision making and had opportunity to have all questions answered. Except as noted below, all chronic problems have been reviewed and are stable to continue medications or other therapy as previously documented in the patient's chart, with changes per orders or documentation below:    1. Right retinal detachment  2. Stage 3b chronic kidney disease ([address]  -     Basic Metabolic Panel w/ Reflex to MG; Future  3. Prediabetes  -     Hemoglobin A1C; Future  4. Vitamin D deficiency  5. Seborrheic keratoses  -     AFL - Agnes Rayo MD, Dermatology, [place]  6.  Hypercholesterolemia  -     Lipid Panel; Future  7. Statin intolerance  -     Lipid Panel; Future  8. Subclinical hypothyroidism  -     TSH with Reflex to FT4; Future  9. Arthritis of left shoulder region          Problem List        Medium    Chronic renal disease, stage III ([facility] [address] [[id number]]    Relevant Orders    Basic Metabolic Panel w/ Reflex to MG    Right retinal detachment - Primary       Unprioritized    Hypercholesterolemia    Relevant Medications    aspirin EC 81 MG EC tablet    ezetimibe (ZETIA) 10 MG tablet    Other Relevant Orders    Lipid Panel        Orders Placed This Encounter   Procedures    Lipid Panel     Standing Status:   Future     Number of Occurrences:   1     Standing Expiration Date:   5/2/2023     Order Specific Question:   Is Patient Fasting?/# of Hours     Answer:   y    TSH with Reflex to FT4     Standing Status:   Future     Number of Occurrences:   1     Standing Expiration Date:   5/2/2023    Basic Metabolic Panel w/ Reflex to MG     Standing Status:   Future     Number of Occurrences:   1     Standing Expiration Date:   5/2/2023    Hemoglobin A1C     Standing Status:   Future     Number of Occurrences:   1     Standing Expiration Date:   5/2/2023    HAFSA - Gato Mancilla MD, Dermatology, [facility]     Referral Priority:   Routine     Referral Type:   Eval and Treat     Referral Reason:   Specialty Services Required     Referred to Provider:   Sonia Ortiz     Requested Specialty:   Dermatology     Number of Visits Requested:   1       Return in about 6 months (around 11/2/2022) for chronic conditions - 30 min visit (extended) .           [facility] - Internal Medicine and Pediatrics  Dr. Roberth Simms D.O.  - Family Medicine and T      Usual doctor's hours are:     Monday 7:30 am to 6:00 pm           Tuesday  7:30 am to 5:00 pm                                               Wednesday 7:30 am to 5:00 pm                                               Thursday 7:30 am to 5:00 pm Friday 7:30 am to 4:00 pm           Saturdays, Sundays, and after hours: E-Visits are available    We observe most federal holidays and Good Friday. We ask that you only contact the office one time per issue or question, and please allow one full business day for a call back. Calling us back multiple times keeps us from being able to complete the work efficiently for you and our other patients. For medication renewals, please call your pharmacist to contact us, and be sure to allow at least 3 business days for processing before you need to  your medication. If you are sick or need an appointment that hasn't been planned, same day appointments are available every day the office is open: Monday, Tuesday, Wednesday, Thursday, and Friday. Call during office hours to schedule, even if it may not be with your regular physician. You may also call the office after 8 am on office days if you need to be seen from an issue the night before. During hours when the office is not normally open, your call will go to the messaging service which cannot provide any service other than paging the doctor. No prescriptions or other nonurgent matters will be handled and no voicemail is available, so please call back during office hours for these matters.        Electronically signed by Sumit Batista DO on 5/2/2022 at 12:48 PM.

## 2022-05-03 LAB
ESTIMATED AVERAGE GLUCOSE: 119.8 MG/DL
HBA1C MFR BLD: 5.8 %

## 2022-05-06 NOTE — PROGRESS NOTES
Medicare Annual Wellness Visit    Valeri Molina is here for Medicare AWV    Assessment & Plan   Initial Medicare annual wellness visit  Medicare annual wellness visit, subsequent      Recommendations for Preventive Services Due: see orders and patient instructions/AVS.  Recommended screening schedule for the next 5-10 years is provided to the patient in written form: see Patient Instructions/AVS.     Return for Medicare Annual Wellness Visit in 1 year. Subjective   The following acute and/or chronic problems were also addressed today:  none    Patient's complete Health Risk Assessment and screening values have been reviewed and are found in Flowsheets. The following problems were reviewed today and where indicated follow up appointments were made and/or referrals ordered. Positive Risk Factor Screenings with Interventions:               General Health and ACP:  General  In general, how would you say your health is?: Very Good  In the past 7 days, have you experienced any of the following: New or Increased Pain, New or Increased Fatigue, Loneliness, Social Isolation, Stress or Anger?: (!) Yes  Select all that apply: (!) New or Increased Pain  Do you get the social and emotional support that you need?: Yes  Do you have a Living Will?: Yes    Advance Directives     Power of  Living Will ACP-Advance Directive ACP-Power of     Not on File Not on File Not on File Not on File      General Health Risk Interventions:  · Increased pain discussed with physician at separate visit today              Objective   There were no vitals filed for this visit. There is no height or weight on file to calculate BMI. Allergies   Allergen Reactions    Famotidine Other (See Comments)     chills    Statins      Prior to Visit Medications    Medication Sig Taking?  Authorizing Provider   hydrOXYzine (VISTARIL) 25 MG capsule TAKE ONE CAPSULE BY MOUTH FOUR TIMES A DAY  Ethan Gonzales, DO ezetimibe (ZETIA) 10 MG tablet Take 1 tablet by mouth daily  Paty Chapman DO   fluticasone-salmeterol (ADVAIR DISKUS) 100-50 MCG/DOSE diskus inhaler Inhale 1 puff into the lungs 2 times daily  Paty Chapman DO   levothyroxine (SYNTHROID) 50 MCG tablet Take 1 tablet by mouth daily  Paty Chapman DO   omeprazole (PRILOSEC) 20 MG delayed release capsule TAKE ONE CAPSULE BY MOUTH DAILY  Rina Moon DO   albuterol sulfate HFA (VENTOLIN HFA) 108 (90 Base) MCG/ACT inhaler Inhale 2 puffs into the lungs 4 times daily as needed for Wheezing  Rina Moon DO   vitamin D (VITAMIN D3 HIGH POTENCY) 1000 units CAPS Take 2 capsules by mouth daily TAKE ONE CAPSULE BY MOUTH EVERY DAY  Demarcus Fletcher MD   Melatonin 3 MG TABS Take 3 mg by mouth daily. Historical Provider, MD   therapeutic multivitamin-minerals (THERAGRAN-M) tablet Take 1 tablet by mouth daily. Historical Provider, MD   aspirin EC 81 MG EC tablet Take 81 mg by mouth daily. Historical Provider, MD [place] (Including outside providers/suppliers regularly involved in providing care):   Patient Care Team:  Paty Chapman DO as PCP - General (Family Medicine)  Paty Chapman DO as PCP - [facility]  Joseph Brooks as Consulting Physician (Gynecology)  Ana Laura Patel MD as Consulting Physician (Gastroenterology)    Reviewed and updated this visit:  Allergies  Meds  Problems       This encounter was performed under myPaty DOs, direct supervision, 5/2/2022.

## 2022-05-06 NOTE — PATIENT INSTRUCTIONS
Personalized Preventive Plan for Paula Canales - 5/2/2022  Medicare offers a range of preventive health benefits. Some of the tests and screenings are paid in full while other may be subject to a deductible, co-insurance, and/or copay. Some of these benefits include a comprehensive review of your medical history including lifestyle, illnesses that may run in your family, and various assessments and screenings as appropriate. After reviewing your medical record and screening and assessments performed today your provider may have ordered immunizations, labs, imaging, and/or referrals for you. A list of these orders (if applicable) as well as your Preventive Care list are included within your After Visit Summary for your review. Other Preventive Recommendations:    · A preventive eye exam performed by an eye specialist is recommended every 1-2 years to screen for glaucoma; cataracts, macular degeneration, and other eye disorders. · A preventive dental visit is recommended every 6 months. · Try to get at least 150 minutes of exercise per week or 10,000 steps per day on a pedometer . · Order or download the FREE \"Exercise & Physical Activity: Your Everyday Guide\" from [employer]. Call 6-137.123.2784 or search [employer] online. · You need 8270-8096 mg of calcium and 4658-4636 IU of vitamin D per day. It is possible to meet your calcium requirement with diet alone, but a vitamin D supplement is usually necessary to meet this goal.  · When exposed to the sun, use a sunscreen that protects against both UVA and UVB radiation with an SPF of 30 or greater. Reapply every 2 to 3 hours or after sweating, drying off with a towel, or swimming. · Always wear a seat belt when traveling in a car. Always wear a helmet when riding a bicycle or motorcycle.   Personalized Preventive Plan for Paula Canales - 5/2/2022  Medicare offers a range of preventive health benefits. Some of the tests and screenings are paid in full while other may be subject to a deductible, co-insurance, and/or copay. Some of these benefits include a comprehensive review of your medical history including lifestyle, illnesses that may run in your family, and various assessments and screenings as appropriate. After reviewing your medical record and screening and assessments performed today your provider may have ordered immunizations, labs, imaging, and/or referrals for you. A list of these orders (if applicable) as well as your Preventive Care list are included within your After Visit Summary for your review. Other Preventive Recommendations:    A preventive eye exam performed by an eye specialist is recommended every 1-2 years to screen for glaucoma; cataracts, macular degeneration, and other eye disorders. A preventive dental visit is recommended every 6 months. Try to get at least 150 minutes of exercise per week or 10,000 steps per day on a pedometer . Order or download the FREE \"Exercise & Physical Activity: Your Everyday Guide\" from [employer]. Call 9-996.270.1426 or search [employer] online. You need 1754-9236 mg of calcium and 2484-8726 IU of vitamin D per day. It is possible to meet your calcium requirement with diet alone, but a vitamin D supplement is usually necessary to meet this goal.  When exposed to the sun, use a sunscreen that protects against both UVA and UVB radiation with an SPF of 30 or greater. Reapply every 2 to 3 hours or after sweating, drying off with a towel, or swimming. Always wear a seat belt when traveling in a car. Always wear a helmet when riding a bicycle or motorcycle.

## 2022-05-09 DIAGNOSIS — E78.00 HYPERCHOLESTEROLEMIA: ICD-10-CM

## 2022-05-09 DIAGNOSIS — Z78.9 STATIN INTOLERANCE: Primary | ICD-10-CM

## 2022-05-09 DIAGNOSIS — E03.9 ACQUIRED HYPOTHYROIDISM: ICD-10-CM

## 2022-05-09 RX ORDER — CHLORAL HYDRATE 500 MG
3000 CAPSULE ORAL 3 TIMES DAILY
Qty: 90 CAPSULE | Refills: 3 | Status: SHIPPED | OUTPATIENT
Start: 2022-05-09 | End: 2022-11-02 | Stop reason: SDUPTHER

## 2022-05-09 RX ORDER — LEVOTHYROXINE SODIUM 0.07 MG/1
75 TABLET ORAL DAILY
Qty: 90 TABLET | Refills: 1 | Status: SHIPPED | OUTPATIENT
Start: 2022-05-09

## 2022-05-23 ENCOUNTER — OFFICE VISIT (OUTPATIENT)
Dept: INTERNAL MEDICINE CLINIC | Age: 72
End: 2022-05-23
Payer: MEDICARE

## 2022-05-23 ENCOUNTER — TELEPHONE (OUTPATIENT)
Dept: INTERNAL MEDICINE CLINIC | Age: 72
End: 2022-05-23

## 2022-05-23 DIAGNOSIS — E03.9 ACQUIRED HYPOTHYROIDISM: ICD-10-CM

## 2022-05-23 DIAGNOSIS — E78.00 HYPERCHOLESTEROLEMIA: ICD-10-CM

## 2022-05-23 DIAGNOSIS — J01.90 ACUTE SINUSITIS, RECURRENCE NOT SPECIFIED, UNSPECIFIED LOCATION: Primary | ICD-10-CM

## 2022-05-23 PROCEDURE — 99214 OFFICE O/P EST MOD 30 MIN: CPT | Performed by: INTERNAL MEDICINE

## 2022-05-23 PROCEDURE — 1036F TOBACCO NON-USER: CPT | Performed by: INTERNAL MEDICINE

## 2022-05-23 PROCEDURE — 3017F COLORECTAL CA SCREEN DOC REV: CPT | Performed by: INTERNAL MEDICINE

## 2022-05-23 PROCEDURE — G8417 CALC BMI ABV UP PARAM F/U: HCPCS | Performed by: INTERNAL MEDICINE

## 2022-05-23 PROCEDURE — G8427 DOCREV CUR MEDS BY ELIG CLIN: HCPCS | Performed by: INTERNAL MEDICINE

## 2022-05-23 PROCEDURE — 1123F ACP DISCUSS/DSCN MKR DOCD: CPT | Performed by: INTERNAL MEDICINE

## 2022-05-23 PROCEDURE — 1090F PRES/ABSN URINE INCON ASSESS: CPT | Performed by: INTERNAL MEDICINE

## 2022-05-23 PROCEDURE — G8400 PT W/DXA NO RESULTS DOC: HCPCS | Performed by: INTERNAL MEDICINE

## 2022-05-23 RX ORDER — BENZONATATE 100 MG/1
100 CAPSULE ORAL 3 TIMES DAILY PRN
Qty: 30 CAPSULE | Refills: 0 | Status: SHIPPED | OUTPATIENT
Start: 2022-05-23 | End: 2022-05-30

## 2022-05-23 RX ORDER — AZITHROMYCIN 250 MG/1
250 TABLET, FILM COATED ORAL SEE ADMIN INSTRUCTIONS
Qty: 6 TABLET | Refills: 0 | Status: SHIPPED | OUTPATIENT
Start: 2022-05-23 | End: 2022-05-28

## 2022-05-23 NOTE — PROGRESS NOTES
Chief Complaint   Patient presents with    Cough     x 2 weeks    Congestion       HPI:  Pt presents with c/o \"sinus infection\". Difficult to get her to define symptoms: States \"I know it is a sinus infection\". Started about 2 weeks ago with scratchy sore throat for a couple of days then nasal congestion with yellow nasal secretions and cough producing thick yellow phlegm as well. No fever or shortness of breath. Patient also questioned me about medication changes made by Dr. Raine Hardwick after her recent labs. These included ( nonfasting)  lipids , for which omega threes were ordered for patient as well as an adjustment to her thyroid medication (increased from 50 to 75 mcg daily). Patient states no one called her to explain these changes. Medications reviewed and reconciled with what patient reports to be taking. /83   Pulse 76   Ht 5' 2\" (1.575 m)   Wt 154 lb (69.9 kg)   SpO2 96%   BMI 28.17 kg/m²     Physical Exam GENERAL: alert, oriented x4, well-appearing in NAD Voice sounds congested      Vitals reviewed from intake /83   Pulse 76   Ht 5' 2\" (1.575 m)   Wt 154 lb (69.9 kg)   SpO2 96%   BMI 28.17 kg/m²     HEENT: normocephalic atraumatic clear conj/nares/op /TMs. No sinus tenderness. NECK: supple without lymphadenopathy or thyromegaly, no bruit    COR: RRR no murmurs rubs or gallops    LUNGS: clear to auscultation with normal work of breathing    ABDOMEN: soft, nontender, normal bowel sounds, no masses or organomegaly noted    EXTREMITIES: warm, dry, well-perfused, no edema    DERM: no suspicious lesions, no rashes    NEURO: cranial nerves intact, normal speech and gait    SPINE: straight, supple, nontender without swelling          ASSESSMENT/PLAN: Pt received counseling and, if relevant, printed instructions for all symptoms listed in CC and HPI, as well as for all diagnoses listed below.     1. Acute sinusitis, recurrence not specified, unspecified location--discussed symptoms originally suggestive of viral URI, but with duration will treat with antibiotics. - azithromycin (ZITHROMAX) 250 MG tablet; Take 1 tablet by mouth See Admin Instructions for 5 days 500mg on day 1 followed by 250mg on days 2 - 5  Dispense: 6 tablet; Refill: 0    2. Hyperlipidemia--patient reports that her recent labs were done in a nonfasting state. I have advised her to try to consume a lower fat diet with omega-3's to help her triglycerides and do a 12-hour fasting specimen at her next follow-up with Dr. Dmitry Rios. 3. Hypothyroidism--discussed the meaning of the elevated TSH and the appropriateness of an adjustment to her levothyroxine dose as was done. Patient remains with many questions which I have referred to her primary care team      Problem List Items Addressed This Visit     Acquired hypothyroidism    Hypercholesterolemia      Other Visit Diagnoses     Acute sinusitis, recurrence not specified, unspecified location    -  Primary    Relevant Medications    azithromycin (ZITHROMAX) 250 MG tablet    benzonatate (TESSALON PERLES) 100 MG capsule            No follow-ups on file.

## 2022-05-23 NOTE — PATIENT INSTRUCTIONS
Patient Education        Saline Nasal Washes: Care Instructions  Overview     Saline nasal washes help keep the nasal passages open by washing out thick or dried mucus. This simple remedy can help relieve symptoms of allergies, sinusitis, and colds. It also can make the nose feel more comfortable by keeping the mucous membranes moist. You may notice a little burning sensation in your nose the first few times you use the solution, but this usually getsbetter in a few days. Follow-up care is a key part of your treatment and safety. Be sure to make and go to all appointments, and call your doctor if you are having problems. It's also a good idea to know your test results and keep alist of the medicines you take. How can you care for yourself at home?  You can buy premixed saline solution in a squeeze bottle or other sinus rinse products at a drugstore. Read and follow the instructions on the label.  You also can make your own saline solution by adding 1 teaspoon of non-iodized salt and 1 teaspoon of baking soda to 2 cups of distilled or boiled and cooled water.  If you use a homemade solution, use a squeeze bottle or neti pot to get the solution into your nose. Room temperature or slightly warmed water may be more comfortable. Make sure it isn't hot.  Stand over the sink with your head tilted forward and slightly to one side. Put only the tip of the syringe or squeeze bottle into the nostril that is farther away from the sink. (The nostril closest to the sink will drain the fluid.) Gently squirt the solution into the nostril and toward the back of your head with your mouth open. The solution should flow out the other nostril. Repeat on the other side. Some sneezing and gagging are normal at first.   Gently blow your nose.  Clean the syringe or bottle after each use.  Repeat this 2 or 3 times a day.  Use nasal washes gently if you have nosebleeds often. When should you call for help?   Watch closely its own in 1 to 2 weeks. But some mildsymptoms may last for several weeks. Sometimes antibiotics are needed. Follow-up care is a key part of your treatment and safety. Be sure to make and go to all appointments, and call your doctor if you are having problems. It's also a good idea to know your test results and keep alist of the medicines you take. How can you care for yourself at home?  Take an over-the-counter pain medicine, such as acetaminophen (Tylenol), ibuprofen (Advil, Motrin), or naproxen (Aleve). Read and follow all instructions on the label.  If the doctor prescribed antibiotics, take them as directed. Do not stop taking them just because you feel better. You need to take the full course of antibiotics.  Be careful when taking over-the-counter cold or flu medicines and Tylenol at the same time. Many of these medicines have acetaminophen, which is Tylenol. Read the labels to make sure that you are not taking more than the recommended dose. Too much acetaminophen (Tylenol) can be harmful.  Breathe warm, moist air from a steamy shower, a hot bath, or a sink filled with hot water. Avoid cold, dry air. Using a humidifier in your home may help. Follow the directions for cleaning the machine.  Use saline (saltwater) nasal washes. This can help keep your nasal passages open and wash out mucus and bacteria. You can buy saline nose drops at a grocery store or drugstore. Or you can make your own at home by adding 1 teaspoon of salt and 1 teaspoon of baking soda to 2 cups of distilled water. If you make your own, fill a bulb syringe with the solution, insert the tip into your nostril, and squeeze gently. Riley Loyd your nose.  Put a hot, wet towel or a warm gel pack on your face 3 or 4 times a day for 5 to 10 minutes each time.  Try a decongestant nasal spray like oxymetazoline (Afrin). Do not use it for more than 3 days in a row. Using it for more than 3 days can make your congestion worse.   When should you call for help? Call your doctor now or seek immediate medical care if:     You have new or worse swelling or redness in your face or around your eyes.      You have a new or higher fever. Watch closely for changes in your health, and be sure to contact your doctor if:     You have new or worse facial pain.      The mucus from your nose becomes thicker (like pus) or has new blood in it.      You are not getting better as expected. Where can you learn more? Go to https://[employer]. org and sign in to your [employer] account. Enter M457 in the [employer] box to learn more about \"Sinusitis: Care Instructions. \"     If you do not have an account, please click on the \"Sign Up Now\" link. Current as of: September 8, 2021               Content Version: 13.2  © 6798-1478 [employer], [employer]. Care instructions adapted under license by [facility] ([facility]). If you have questions about a medical condition or this instruction, always ask your healthcare professional. [place] [address] any warranty or liability for your use of this information.

## 2022-05-23 NOTE — TELEPHONE ENCOUNTER
Pt is requesting antibiotic for a sinus infection. States she would not like to do a VV or in person visit because she knows what she has.

## 2022-05-24 VITALS
OXYGEN SATURATION: 96 % | WEIGHT: 154 LBS | HEIGHT: 62 IN | DIASTOLIC BLOOD PRESSURE: 83 MMHG | BODY MASS INDEX: 28.34 KG/M2 | SYSTOLIC BLOOD PRESSURE: 138 MMHG | HEART RATE: 76 BPM

## 2022-05-24 PROBLEM — E03.9 ACQUIRED HYPOTHYROIDISM: Status: ACTIVE | Noted: 2022-05-24

## 2022-07-14 DIAGNOSIS — U07.1 COVID-19: Primary | ICD-10-CM

## 2022-07-14 RX ORDER — FLUTICASONE PROPIONATE AND SALMETEROL 100; 50 UG/1; UG/1
1 POWDER RESPIRATORY (INHALATION) 2 TIMES DAILY
Qty: 60 EACH | Refills: 12 | Status: SHIPPED | OUTPATIENT
Start: 2022-07-14 | End: 2022-07-14 | Stop reason: SDUPTHER

## 2022-07-14 RX ORDER — FLUTICASONE PROPIONATE AND SALMETEROL 100; 50 UG/1; UG/1
1 POWDER RESPIRATORY (INHALATION) 2 TIMES DAILY
Qty: 60 EACH | Refills: 12 | Status: SHIPPED | OUTPATIENT
Start: 2022-07-14

## 2022-07-14 NOTE — TELEPHONE ENCOUNTER
Requested Prescriptions     Pending Prescriptions Disp Refills    fluticasone-salmeterol (ADVIAR) 100-50 MCG/ACT AEPB diskus inhaler 60 each 6     Sig: Inhale 1 puff into the lungs every 12 hours   Patient requesting a medication refill.   Pharmacy: [facility] Fax#[phone]  Next office visit: 11/2/2022  Last regular office visit: 5/2/2022

## 2022-07-23 DIAGNOSIS — F51.01 PRIMARY INSOMNIA: ICD-10-CM

## 2022-07-25 ENCOUNTER — TELEPHONE (OUTPATIENT)
Dept: INTERNAL MEDICINE CLINIC | Age: 72
End: 2022-07-25

## 2022-07-25 ENCOUNTER — TELEMEDICINE (OUTPATIENT)
Dept: INTERNAL MEDICINE CLINIC | Age: 72
End: 2022-07-25
Payer: MEDICARE

## 2022-07-25 DIAGNOSIS — U07.1 PNEUMONIA DUE TO COVID-19 VIRUS: Primary | ICD-10-CM

## 2022-07-25 DIAGNOSIS — J12.82 PNEUMONIA DUE TO COVID-19 VIRUS: Primary | ICD-10-CM

## 2022-07-25 DIAGNOSIS — E78.00 HYPERCHOLESTEROLEMIA: ICD-10-CM

## 2022-07-25 DIAGNOSIS — F51.01 PRIMARY INSOMNIA: ICD-10-CM

## 2022-07-25 DIAGNOSIS — Z09 ENCOUNTER FOR EXAMINATION FOLLOWING TREATMENT AT HOSPITAL: ICD-10-CM

## 2022-07-25 PROCEDURE — 3017F COLORECTAL CA SCREEN DOC REV: CPT | Performed by: INTERNAL MEDICINE

## 2022-07-25 PROCEDURE — 1090F PRES/ABSN URINE INCON ASSESS: CPT | Performed by: INTERNAL MEDICINE

## 2022-07-25 PROCEDURE — G8428 CUR MEDS NOT DOCUMENT: HCPCS | Performed by: INTERNAL MEDICINE

## 2022-07-25 PROCEDURE — 1123F ACP DISCUSS/DSCN MKR DOCD: CPT | Performed by: INTERNAL MEDICINE

## 2022-07-25 PROCEDURE — G8400 PT W/DXA NO RESULTS DOC: HCPCS | Performed by: INTERNAL MEDICINE

## 2022-07-25 PROCEDURE — 99213 OFFICE O/P EST LOW 20 MIN: CPT | Performed by: INTERNAL MEDICINE

## 2022-07-25 RX ORDER — HYDROXYZINE PAMOATE 25 MG/1
CAPSULE ORAL
Qty: 120 CAPSULE | Refills: 0 | Status: SHIPPED | OUTPATIENT
Start: 2022-07-25 | End: 2022-11-02 | Stop reason: SDUPTHER

## 2022-07-25 RX ORDER — EZETIMIBE 10 MG/1
10 TABLET ORAL DAILY
Qty: 90 TABLET | Refills: 1 | Status: SHIPPED | OUTPATIENT
Start: 2022-07-25

## 2022-07-25 RX ORDER — HYDROXYZINE PAMOATE 25 MG/1
CAPSULE ORAL
Qty: 120 CAPSULE | Refills: 0 | OUTPATIENT
Start: 2022-07-25

## 2022-07-25 NOTE — PROGRESS NOTES
Chief Complaint   Patient presents with    Positive For Covid-19       HPI: Virtual visit via [employer] during covid-19 pandemic for c/o covid 19, ER visit at [facility] 7/12 for nausea, vomiting, diarrhea. States traveled to [place] and had sinus infection, then changed the day prior to ER visit with malaise, vominting, and diarrhea. Feelign better now, but concerned about pneumonia seen on CXR at ER. States using inhalers but just as per usual.    Poor quality connection during visit. Medications reviewed and reconciled with what patient reports to be taking. There were no vitals taken for this visit. Physical Exam well appearing, speaking in full sentences, did have deep cough x 1    ASSESSMENT/PLAN: Pt received counseling and, if relevant, printed instructions for all symptoms listed in CC and HPI, as well as for all diagnoses listed below. 1. Pneumonia due to COVID-19 virus--clinically improved but patient requesting followup on pneumonia. Advised to wait another 2-3 weeks before repeat CXR. Continue symptomatic care (has tessalon for cough), recheck if sob or fever. - XR CHEST STANDARD (2 VW); Future    Problem List Items Addressed This Visit    None  Visit Diagnoses       Pneumonia due to COVID-19 virus    -  Primary    Relevant Orders    XR CHEST STANDARD (2 VW)    Encounter for examination following treatment at hospital                  Return if symptoms worsen or fail to improve. Cristiano Canales, was evaluated through a synchronous (real-time) audio-video encounter. The patient (or guardian if applicable) is aware that this is a billable service, which includes applicable co-pays. This Virtual Visit was conducted with patient's (and/or legal guardian's) consent. The visit was conducted pursuant to the emergency declaration under the [facility], [address] [place] [address] waiver authority and the [employer].   Patient

## 2022-07-25 NOTE — TELEPHONE ENCOUNTER
Pt was seen in ED Monday July 11 and was diagnosed with covid  She was put on paplovid- which she has finished  She went back to the ED on Tuesday July 12 for dehydration  Pt has appt with [facility] on 11.2.2022  She is stilled tired and somewhat sob but is using her inhalers  She still has cough, and clear mucus discharge    Pt would like to know what she should do, come in earlier than November or wait till then to be seen    Please advise

## 2022-07-29 ENCOUNTER — TELEPHONE (OUTPATIENT)
Dept: INTERNAL MEDICINE CLINIC | Age: 72
End: 2022-07-29

## 2022-07-29 NOTE — TELEPHONE ENCOUNTER
Patient is calling to see if Dr. Yeni Santos will order home health care for her. She fell and broke her wrist and her elbow and her  just got out of the hospital.    He sees a [address] I suggested reaching out to his doctor or the hospital that discharged him to request home care. I also suggested that she reach out to the orthopedic doctor to see if they can order the home health since they saw her most recently. I offered an appointment with Dr. Yeni Santos but it is not likely insurance will pay for home health for a broken bone.

## 2022-11-02 ENCOUNTER — OFFICE VISIT (OUTPATIENT)
Dept: INTERNAL MEDICINE CLINIC | Age: 72
End: 2022-11-02
Payer: MEDICARE

## 2022-11-02 VITALS
HEIGHT: 62 IN | OXYGEN SATURATION: 97 % | SYSTOLIC BLOOD PRESSURE: 131 MMHG | BODY MASS INDEX: 27.05 KG/M2 | HEART RATE: 83 BPM | DIASTOLIC BLOOD PRESSURE: 88 MMHG | WEIGHT: 147 LBS

## 2022-11-02 DIAGNOSIS — E78.00 HYPERCHOLESTEROLEMIA: ICD-10-CM

## 2022-11-02 DIAGNOSIS — E55.9 VITAMIN D DEFICIENCY: ICD-10-CM

## 2022-11-02 DIAGNOSIS — R73.03 PREDIABETES: Primary | ICD-10-CM

## 2022-11-02 DIAGNOSIS — N18.32 STAGE 3B CHRONIC KIDNEY DISEASE (HCC): ICD-10-CM

## 2022-11-02 DIAGNOSIS — Z78.9 STATIN INTOLERANCE: ICD-10-CM

## 2022-11-02 DIAGNOSIS — G47.01 INSOMNIA SECONDARY TO CHRONIC PAIN: ICD-10-CM

## 2022-11-02 DIAGNOSIS — J45.30 MILD PERSISTENT ASTHMA WITHOUT COMPLICATION: ICD-10-CM

## 2022-11-02 DIAGNOSIS — E03.9 ACQUIRED HYPOTHYROIDISM: ICD-10-CM

## 2022-11-02 DIAGNOSIS — G89.29 INSOMNIA SECONDARY TO CHRONIC PAIN: ICD-10-CM

## 2022-11-02 DIAGNOSIS — F51.01 PRIMARY INSOMNIA: ICD-10-CM

## 2022-11-02 DIAGNOSIS — R73.03 PREDIABETES: ICD-10-CM

## 2022-11-02 LAB
ANION GAP SERPL CALCULATED.3IONS-SCNC: 12 MMOL/L (ref 3–16)
BUN BLDV-MCNC: 22 MG/DL (ref 7–20)
CALCIUM SERPL-MCNC: 9.8 MG/DL (ref 8.3–10.6)
CHLORIDE BLD-SCNC: 103 MMOL/L (ref 99–110)
CHOLESTEROL, TOTAL: 277 MG/DL (ref 0–199)
CO2: 26 MMOL/L (ref 21–32)
CREAT SERPL-MCNC: 0.9 MG/DL (ref 0.6–1.2)
GFR SERPL CREATININE-BSD FRML MDRD: >60 ML/MIN/{1.73_M2}
GLUCOSE BLD-MCNC: 99 MG/DL (ref 70–99)
HDLC SERPL-MCNC: 58 MG/DL (ref 40–60)
LDL CHOLESTEROL CALCULATED: 170 MG/DL
POTASSIUM SERPL-SCNC: 4.3 MMOL/L (ref 3.5–5.1)
SODIUM BLD-SCNC: 141 MMOL/L (ref 136–145)
TRIGL SERPL-MCNC: 246 MG/DL (ref 0–150)
TSH REFLEX FT4: 1.29 UIU/ML (ref 0.27–4.2)
VITAMIN D 25-HYDROXY: 71.3 NG/ML
VLDLC SERPL CALC-MCNC: 49 MG/DL

## 2022-11-02 PROCEDURE — 3017F COLORECTAL CA SCREEN DOC REV: CPT | Performed by: FAMILY MEDICINE

## 2022-11-02 PROCEDURE — G8400 PT W/DXA NO RESULTS DOC: HCPCS | Performed by: FAMILY MEDICINE

## 2022-11-02 PROCEDURE — 1036F TOBACCO NON-USER: CPT | Performed by: FAMILY MEDICINE

## 2022-11-02 PROCEDURE — G0008 ADMIN INFLUENZA VIRUS VAC: HCPCS | Performed by: FAMILY MEDICINE

## 2022-11-02 PROCEDURE — 99214 OFFICE O/P EST MOD 30 MIN: CPT | Performed by: FAMILY MEDICINE

## 2022-11-02 PROCEDURE — 1123F ACP DISCUSS/DSCN MKR DOCD: CPT | Performed by: FAMILY MEDICINE

## 2022-11-02 PROCEDURE — 90694 VACC AIIV4 NO PRSRV 0.5ML IM: CPT | Performed by: FAMILY MEDICINE

## 2022-11-02 PROCEDURE — 1090F PRES/ABSN URINE INCON ASSESS: CPT | Performed by: FAMILY MEDICINE

## 2022-11-02 PROCEDURE — G8427 DOCREV CUR MEDS BY ELIG CLIN: HCPCS | Performed by: FAMILY MEDICINE

## 2022-11-02 PROCEDURE — G8417 CALC BMI ABV UP PARAM F/U: HCPCS | Performed by: FAMILY MEDICINE

## 2022-11-02 PROCEDURE — G8484 FLU IMMUNIZE NO ADMIN: HCPCS | Performed by: FAMILY MEDICINE

## 2022-11-02 RX ORDER — CHLORAL HYDRATE 500 MG
3000 CAPSULE ORAL 3 TIMES DAILY
Qty: 90 CAPSULE | Refills: 3 | Status: SHIPPED | OUTPATIENT
Start: 2022-11-02

## 2022-11-02 RX ORDER — HYDROXYZINE PAMOATE 25 MG/1
CAPSULE ORAL
Qty: 120 CAPSULE | Refills: 1 | Status: SHIPPED | OUTPATIENT
Start: 2022-11-02

## 2022-11-02 ASSESSMENT — ENCOUNTER SYMPTOMS
CONSTIPATION: 0
DIARRHEA: 0
VOMITING: 0
SHORTNESS OF BREATH: 0
NAUSEA: 0
COUGH: 0

## 2022-11-02 NOTE — PROGRESS NOTES
Royal Jonel Canales (:  1950) is a 66 y.o. female,Established patient, here for evaluation of the following chief complaint(s):  6 Month Follow-Up      SUBJECTIVE:  22 --  Had multiple things occur since last visit. Fractured elbow and wrist of left arm in early August, was seen by Dr. July Millan at [facility] and she had surgical repair. Still having PT and doing ok, but not fully improved yet. Going to [facility] in [place] for PT now. Had some dry eye of the left eye, but thinks it was related to having thyroid issues  Needs lab faisalal  CANNOT have statins, will not tolerate. -- will start fish oil today      22 -   Went to ophthalmology on 22 -- retinal detachment od (right); macula on emergent referral to retina service -- she ended up having surgery on 22 emergently   Did not need sclera banding, did have to keep head down for 3 days, but so far she has seen them twice between that surgery and now. She is getting to the point where she has the gas bubble still present in her vision, but is able to see a little bit more than before. Not driving. Would like skin referral for skin check -- will refer to dermatology of the [facility].     Does have some left shoulder arthritis, worked as a  for 20 years. Not bothering her enough for daily preventative/treatment, occasional tylenol PRN. Will get medicare annual wellness examination today      21 -- Asthma-currently using Advair and albuterol. mild shortness of breath with exercising but improved with albuterol prn     Vit D deficiency- daily vitamin D supplement      HLD-   Last lipid panel in 2020: , Trig 178, HDL 61, Total 329. She has been unable to tolerate statins. (Pravastatin gave her insomnia, atorvastatin gave her acid reflux). She admits to being averse to medications and worries about side effects. GERD-omeprazole 20 mg daily     CKD3- baseline Scr 1.1.  Has some urinary frequency but otherwise no  complaints     Pre-diabetes- hemoglobin a1c of 6.4 in May 2019. She is very averse to taking medications. Subclinical hypothyroidism - last TSH 7.97 as of 6/19/2020. C/O some generalized brain fog but otherwise denies constipation, skin dryness, hair loss, weight gain           I have reviewed the chart notes available from myself and other providers. I have reviewed and addressed all active problems and created or updated the problems list in detail, as needed    I have extensively reviewed and reconciled the medication list, discontinued medications not taking or no longer appropriate, and updated the active meds list    OBJECTIVE:  Review of Systems   Constitutional:  Negative for chills and fever. Respiratory:  Negative for cough and shortness of breath. Cardiovascular:  Negative for leg swelling. Gastrointestinal:  Negative for constipation, diarrhea, nausea and vomiting. Endocrine: Negative for polyuria. Genitourinary:  Negative for frequency. Skin:  Negative for rash. Vitals:    11/02/22 1145   BP: 131/88   Pulse: 83   SpO2: 97%   Weight: 147 lb (66.7 kg)   Height: 5' 2\" (1.575 m)      Body mass index is 26.89 kg/m². Physical Exam  Constitutional:       Appearance: Normal appearance. Cardiovascular:      Rate and Rhythm: Normal rate and regular rhythm. Pulses: Normal pulses. Heart sounds: Normal heart sounds. Pulmonary:      Effort: Pulmonary effort is normal.      Breath sounds: Normal breath sounds. Musculoskeletal:         General: Tenderness: left A/C joint but full ROM. Normal range of motion. Right lower leg: No edema. Left lower leg: No edema. Neurological:      General: No focal deficit present. Mental Status: She is alert. Mental status is at baseline.          Lab Results   Component Value Date    LABA1C 5.8 05/02/2022     Lab Results   Component Value Date    WBC 7.3 06/18/2021    HGB 15.0 06/18/2021    HCT 44.2 06/18/2021    MCV 87.1 06/18/2021     06/18/2021      Lab Results   Component Value Date/Time     11/02/2022 12:35 PM    K 4.3 11/02/2022 12:35 PM    K 4.4 05/02/2022 12:27 PM     11/02/2022 12:35 PM    CO2 26 11/02/2022 12:35 PM    BUN 22 11/02/2022 12:35 PM    CREATININE 0.9 11/02/2022 12:35 PM    GLUCOSE 99 11/02/2022 12:35 PM    CALCIUM 9.8 11/02/2022 12:35 PM       Lab Results   Component Value Date    CHOL 277 (H) 11/02/2022    CHOL 282 (H) 05/02/2022    CHOL 264 (H) 08/03/2021     Lab Results   Component Value Date    TRIG 246 (H) 11/02/2022    TRIG 210 (H) 05/02/2022    TRIG 182 (H) 08/03/2021     Lab Results   Component Value Date    HDL 58 11/02/2022    HDL 57 05/02/2022    HDL 58 08/03/2021     Lab Results   Component Value Date    LDLCALC 170 (H) 11/02/2022    LDLCALC 183 (H) 05/02/2022    LDLCALC 170 (H) 08/03/2021     Lab Results   Component Value Date    LABVLDL 49 11/02/2022    LABVLDL 42 05/02/2022    LABVLDL 36 08/03/2021     No results found for: CHOLHDLRATIO     The 10-year ASCVD risk score (Batsheva DK, et al., 2019) is: 13%    Values used to calculate the score:      Age: 66 years      Sex: Female      Is Non- : No      Diabetic: No      Tobacco smoker: No      Systolic Blood Pressure: 069 mmHg      Is BP treated: No      HDL Cholesterol: 58 mg/dL      Total Cholesterol: 277 mg/dL     Patient received counseling and, if relevant, printed instructions for all symptoms listed in CC and HPI, as well as for all diagnoses brought onto today's visit note below. Typical counseling includes, but is not limited to, non-pharmacologic measures to manage listed symptoms and conditions; appropriate use, risks and benefits for all prescribed medications; potential interactions between medications both prescribed and OTC; diet; exercise; healthy behaviors; and goalsetting to improve health.  Patient or responsible party was involved in shared decision making and had opportunity to have all questions answered. Except as noted below, all chronic problems have been reviewed and are stable to continue medications or other therapy as previously documented in the patient's chart, with changes per orders or documentation below:    1. Prediabetes  -     Basic Metabolic Panel; Future  2. Insomnia secondary to chronic pain  3. Acquired hypothyroidism  -     TSH with Reflex to FT4; Future  4. Hypercholesterolemia  -     Lipid Panel; Future  -     Omega-3 Fatty Acids (FISH OIL) 1000 MG capsule; Take 3 capsules by mouth 3 times daily, Disp-90 capsule, R-3Normal  5. Stage 3b chronic kidney disease ([facility] [address]  -     Basic Metabolic Panel; Future  6. Primary insomnia  -     hydrOXYzine pamoate (VISTARIL) 25 MG capsule; TAKE ONE CAPSULE BY MOUTH FOUR TIMES A DAY, Disp-120 capsule, R-1Normal  7. Vitamin D deficiency  -     Vitamin D 25 Hydroxy; Future  8. Statin intolerance  -     Omega-3 Fatty Acids (FISH OIL) 1000 MG capsule; Take 3 capsules by mouth 3 times daily, Disp-90 capsule, R-3Normal  9.  Mild persistent asthma without complication          Problem List          Medium    Chronic renal disease, stage III ([facility] [address] [[id number]]    Relevant Orders    Basic Metabolic Panel (Completed)    Acquired hypothyroidism    Relevant Medications    levothyroxine (SYNTHROID) 75 MCG tablet    Other Relevant Orders    TSH with Reflex to FT4 (Completed)       Unprioritized    Mild persistent asthma without complication (Chronic)    Relevant Medications    albuterol sulfate HFA (VENTOLIN HFA) 108 (90 Base) MCG/ACT inhaler    fluticasone-salmeterol (ADVIAR) 100-50 MCG/ACT AEPB diskus inhaler    Hypercholesterolemia    Relevant Medications    aspirin EC 81 MG EC tablet    ezetimibe (ZETIA) 10 MG tablet    Omega-3 Fatty Acids (FISH OIL) 1000 MG capsule    Other Relevant Orders    Lipid Panel (Completed)    Insomnia    Relevant Medications    hydrOXYzine pamoate (VISTARIL) 25 MG capsule     Orders Placed This Encounter Procedures    Influenza, FLUAD, (age 72 y+), IM, Preservative Free, 0.5 mL    TSH with Reflex to FT4     Standing Status:   Future     Number of Occurrences:   1     Standing Expiration Date:   11/2/2023    Lipid Panel     Standing Status:   Future     Number of Occurrences:   1     Standing Expiration Date:   84/2/8976    Basic Metabolic Panel     Standing Status:   Future     Number of Occurrences:   1     Standing Expiration Date:   11/2/2023    Vitamin D 25 Hydroxy     Standing Status:   Future     Number of Occurrences:   1     Standing Expiration Date:   11/2/2023       Return in about 2 months (around 1/2/2023). [facility] - Internal Medicine and Pediatrics  Dr. Nona Valadez D.O.  - Family Medicine and T      Usual doctor's hours are:     Monday 7:30 am to 6:00 pm           Tuesday  7:30 am to 5:00 pm                                               Wednesday 7:30 am to 5:00 pm                                               Thursday 7:30 am to 5:00 pm                                               Friday 7:30 am to 4:00 pm           Saturdays, Sundays, and after hours: E-Visits are available    We observe most federal holidays and Good Friday. We ask that you only contact the office one time per issue or question, and please allow one full business day for a call back. Calling us back multiple times keeps us from being able to complete the work efficiently for you and our other patients. For medication renewals, please call your pharmacist to contact us, and be sure to allow at least 3 business days for processing before you need to  your medication. If you are sick or need an appointment that hasn't been planned, same day appointments are available every day the office is open: Monday, Tuesday, Wednesday, Thursday, and Friday. Call during office hours to schedule, even if it may not be with your regular physician.  You may also call the office after 8 am on office days if you need to be seen from an issue the night before. During hours when the office is not normally open, your call will go to the messaging service which cannot provide any service other than paging the doctor. No prescriptions or other nonurgent matters will be handled and no voicemail is available, so please call back during office hours for these matters.        Electronically signed by Lio Adamson DO on 11/2/2022 at 11:46 AM.

## 2022-11-21 ENCOUNTER — TELEPHONE (OUTPATIENT)
Dept: INTERNAL MEDICINE CLINIC | Age: 72
End: 2022-11-21

## 2022-11-21 DIAGNOSIS — K21.9 GASTROESOPHAGEAL REFLUX DISEASE WITHOUT ESOPHAGITIS: ICD-10-CM

## 2022-11-21 RX ORDER — OMEPRAZOLE 20 MG/1
CAPSULE, DELAYED RELEASE ORAL
Qty: 90 CAPSULE | Refills: 1 | Status: SHIPPED | OUTPATIENT
Start: 2022-11-21

## 2022-11-21 RX ORDER — OMEPRAZOLE 20 MG/1
CAPSULE, DELAYED RELEASE ORAL
Qty: 90 CAPSULE | Refills: 1 | OUTPATIENT
Start: 2022-11-21

## 2022-11-21 NOTE — TELEPHONE ENCOUNTER
Requested Prescriptions     Pending Prescriptions Disp Refills    omeprazole (PRILOSEC) 20 MG delayed release capsule 90 capsule 1     Sig: TAKE ONE CAPSULE BY MOUTH DAILY     Patient requesting a medication refill.     Next office visit: 1/9/2023    Last regular office visit: 11/2/2022

## 2023-01-18 ENCOUNTER — OFFICE VISIT (OUTPATIENT)
Dept: INTERNAL MEDICINE CLINIC | Age: 73
End: 2023-01-18

## 2023-01-18 VITALS
DIASTOLIC BLOOD PRESSURE: 80 MMHG | BODY MASS INDEX: 27.58 KG/M2 | OXYGEN SATURATION: 97 % | HEART RATE: 60 BPM | WEIGHT: 150.8 LBS | SYSTOLIC BLOOD PRESSURE: 120 MMHG

## 2023-01-18 DIAGNOSIS — N18.32 STAGE 3B CHRONIC KIDNEY DISEASE (HCC): Chronic | ICD-10-CM

## 2023-01-18 DIAGNOSIS — Z78.9 STATIN INTOLERANCE: ICD-10-CM

## 2023-01-18 DIAGNOSIS — G47.00 INSOMNIA, UNSPECIFIED TYPE: Primary | ICD-10-CM

## 2023-01-18 DIAGNOSIS — E78.00 HYPERCHOLESTEROLEMIA: Chronic | ICD-10-CM

## 2023-01-18 SDOH — ECONOMIC STABILITY: FOOD INSECURITY: WITHIN THE PAST 12 MONTHS, YOU WORRIED THAT YOUR FOOD WOULD RUN OUT BEFORE YOU GOT MONEY TO BUY MORE.: NEVER TRUE

## 2023-01-18 SDOH — ECONOMIC STABILITY: FOOD INSECURITY: WITHIN THE PAST 12 MONTHS, THE FOOD YOU BOUGHT JUST DIDN'T LAST AND YOU DIDN'T HAVE MONEY TO GET MORE.: NEVER TRUE

## 2023-01-18 ASSESSMENT — SOCIAL DETERMINANTS OF HEALTH (SDOH): HOW HARD IS IT FOR YOU TO PAY FOR THE VERY BASICS LIKE FOOD, HOUSING, MEDICAL CARE, AND HEATING?: NOT HARD AT ALL

## 2023-01-18 ASSESSMENT — PATIENT HEALTH QUESTIONNAIRE - PHQ9
2. FEELING DOWN, DEPRESSED OR HOPELESS: 0
SUM OF ALL RESPONSES TO PHQ QUESTIONS 1-9: 0
1. LITTLE INTEREST OR PLEASURE IN DOING THINGS: 0
SUM OF ALL RESPONSES TO PHQ QUESTIONS 1-9: 0
SUM OF ALL RESPONSES TO PHQ QUESTIONS 1-9: 0
SUM OF ALL RESPONSES TO PHQ9 QUESTIONS 1 & 2: 0
DEPRESSION UNABLE TO ASSESS: PT REFUSES
SUM OF ALL RESPONSES TO PHQ QUESTIONS 1-9: 0

## 2023-01-18 ASSESSMENT — ENCOUNTER SYMPTOMS
CONSTIPATION: 0
COUGH: 0
VOMITING: 0
SHORTNESS OF BREATH: 0
DIARRHEA: 0
NAUSEA: 0

## 2023-01-18 NOTE — PROGRESS NOTES
Malu Canales (:  1950) is a 67 y.o. female,Established patient, here for evaluation of the following chief complaint(s):  No chief complaint on file. SUBJECTIVE:  23 -   Cannot sleep on left side due to arm, can sleep on right side. Does wake in the night most nights, will fall asleep but then is woken up having to urinate at least once per night maybe twice. Dog also will wake her to go out in the middle of the night. Tried melatonin, has tried Tylenol PM. Has not tried unisom or trazodone    --Will recommend Unisom, up to 25 mg nightly. Pt wants to pick this up over the counter  Does not want Seroquel, as her sister had tardive dyskinesia    Gets itchy on her back, is putting a Hemp based cream on her torso in the front and this is helping with the itching.   -- will continue using hydroxyzine         22 --  Had multiple things occur since last visit. Fractured elbow and wrist of left arm in early August, was seen by Dr. Dorothea Berry at [facility] and she had surgical repair. Still having PT and doing ok, but not fully improved yet. Going to [facility] in [facility] [place] for PT now. Had some dry eye of the left eye, but thinks it was related to having thyroid issues  Needs lab luz  CANNOT have statins, will not tolerate. -- will start fish oil today      22 -   Went to ophthalmology on 22 -- retinal detachment od (right); macula on emergent referral to retina service -- she ended up having surgery on 22 emergently   Did not need sclera banding, did have to keep head down for 3 days, but so far she has seen them twice between that surgery and now. She is getting to the point where she has the gas bubble still present in her vision, but is able to see a little bit more than before. Not driving.      Would like skin referral for skin check -- will refer to dermatology of the [facility].     Does have some left shoulder arthritis, worked as a  for  years.  Not bothering her enough for daily preventative/treatment, occasional tylenol PRN. Will get medicare annual wellness examination today      12. 1.21 -- Asthma-currently using Advair and albuterol. mild shortness of breath with exercising but improved with albuterol prn     Vit D deficiency- daily vitamin D supplement      HLD-   Last lipid panel in 6/19/2020: , Trig 178, HDL 61, Total 329. She has been unable to tolerate statins. (Pravastatin gave her insomnia, atorvastatin gave her acid reflux). She admits to being averse to medications and worries about side effects. GERD-omeprazole 20 mg daily     CKD3- baseline Scr 1.1. Has some urinary frequency but otherwise no  complaints     Pre-diabetes- hemoglobin a1c of 6.4 in May 2019. She is very averse to taking medications. Subclinical hypothyroidism - last TSH 7.97 as of 6/19/2020. C/O some generalized brain fog but otherwise denies constipation, skin dryness, hair loss, weight gain           I have reviewed the chart notes available from myself and other providers. I have reviewed and addressed all active problems and created or updated the problems list in detail, as needed    I have extensively reviewed and reconciled the medication list, discontinued medications not taking or no longer appropriate, and updated the active meds list    OBJECTIVE:  Review of Systems   Constitutional:  Negative for chills and fever. Respiratory:  Negative for cough and shortness of breath. Cardiovascular:  Negative for leg swelling. Gastrointestinal:  Negative for constipation, diarrhea, nausea and vomiting. Endocrine: Negative for polyuria. Genitourinary:  Negative for frequency. Skin:  Negative for rash. Vitals:    01/18/23 1531   BP: 120/80   Site: Left Upper Arm   Position: Sitting   Cuff Size: Medium Adult   Pulse: 60   SpO2: 97%   Weight: 150 lb 12.8 oz (68.4 kg)      Body mass index is 27.58 kg/m².      Physical Exam  Constitutional:       Appearance: Normal appearance. Cardiovascular:      Rate and Rhythm: Normal rate and regular rhythm. Pulses: Normal pulses. Heart sounds: Normal heart sounds. Pulmonary:      Effort: Pulmonary effort is normal.      Breath sounds: Normal breath sounds. Musculoskeletal:         General: Tenderness: left A/C joint but full ROM. Normal range of motion. Right lower leg: No edema. Left lower leg: No edema. Neurological:      General: No focal deficit present. Mental Status: She is alert. Mental status is at baseline.          Lab Results   Component Value Date    LABA1C 5.8 05/02/2022     Lab Results   Component Value Date    WBC 7.3 06/18/2021    HGB 15.0 06/18/2021    HCT 44.2 06/18/2021    MCV 87.1 06/18/2021     06/18/2021      Lab Results   Component Value Date/Time     11/02/2022 12:35 PM    K 4.3 11/02/2022 12:35 PM    K 4.4 05/02/2022 12:27 PM     11/02/2022 12:35 PM    CO2 26 11/02/2022 12:35 PM    BUN 22 11/02/2022 12:35 PM    CREATININE 0.9 11/02/2022 12:35 PM    GLUCOSE 99 11/02/2022 12:35 PM    CALCIUM 9.8 11/02/2022 12:35 PM       Lab Results   Component Value Date    CHOL 277 (H) 11/02/2022    CHOL 282 (H) 05/02/2022    CHOL 264 (H) 08/03/2021     Lab Results   Component Value Date    TRIG 246 (H) 11/02/2022    TRIG 210 (H) 05/02/2022    TRIG 182 (H) 08/03/2021     Lab Results   Component Value Date    HDL 58 11/02/2022    HDL 57 05/02/2022    HDL 58 08/03/2021     Lab Results   Component Value Date    LDLCALC 170 (H) 11/02/2022    LDLCALC 183 (H) 05/02/2022    LDLCALC 170 (H) 08/03/2021     Lab Results   Component Value Date    LABVLDL 49 11/02/2022    LABVLDL 42 05/02/2022    LABVLDL 36 08/03/2021     No results found for: CHOLHDLRATIO     The 10-year ASCVD risk score (Batsheva DK, et al., 2019) is: 12.2%    Values used to calculate the score:      Age: 67 years      Sex: Female      Is Non- : No Diabetic: No      Tobacco smoker: No      Systolic Blood Pressure: 977 mmHg      Is BP treated: No      HDL Cholesterol: 58 mg/dL      Total Cholesterol: 277 mg/dL     Patient received counseling and, if relevant, printed instructions for all symptoms listed in CC and HPI, as well as for all diagnoses brought onto today's visit note below. Typical counseling includes, but is not limited to, non-pharmacologic measures to manage listed symptoms and conditions; appropriate use, risks and benefits for all prescribed medications; potential interactions between medications both prescribed and OTC; diet; exercise; healthy behaviors; and goalsetting to improve health. Patient or responsible party was involved in shared decision making and had opportunity to have all questions answered. Except as noted below, all chronic problems have been reviewed and are stable to continue medications or other therapy as previously documented in the patient's chart, with changes per orders or documentation below:    1. Insomnia, unspecified type  Comments:  recommended she purchase Unisom otc, she does not have Part D insurance coverage  2. Stage 3b chronic kidney disease ([facility] [address]  Comments:  avoid nephrotoxic medications  3. Statin intolerance  4. Hypercholesterolemia  Comments:  will continue fish oil, cannot tolerate statins. thinks she is taking zetia but is unsure          Problem List          Medium    Chronic renal disease, stage III ([facility] [address] [[id number]]       Unprioritized    Hypercholesterolemia    Relevant Medications    aspirin EC 81 MG EC tablet    ezetimibe (ZETIA) 10 MG tablet    Omega-3 Fatty Acids (FISH OIL) 1000 MG capsule    Insomnia - Primary    Relevant Medications    hydrOXYzine pamoate (VISTARIL) 25 MG capsule   No orders of the defined types were placed in this encounter. Return in about 6 months (around 7/18/2023) for med follow up, insomnia follow up.           [facility] - Internal Medicine and Pediatrics   Peterson London D.O.  - Family Medicine and OMT      Usual doctor's hours are:     Monday 7:30 am to 6:00 pm           Tuesday  7:30 am to 5:00 pm                                               Wednesday 7:30 am to 5:00 pm                                               Thursday 7:30 am to 5:00 pm                                               Friday 7:30 am to 4:00 pm           Saturdays, Sundays, and after hours: E-Visits are available    We observe most federal holidays and Good Friday. We ask that you only contact the office one time per issue or question, and please allow one full business day for a call back. Calling us back multiple times keeps us from being able to complete the work efficiently for you and our other patients. For medication renewals, please call your pharmacist to contact us, and be sure to allow at least 3 business days for processing before you need to  your medication. If you are sick or need an appointment that hasn't been planned, same day appointments are available every day the office is open: Monday, Tuesday, Wednesday, Thursday, and Friday. Call during office hours to schedule, even if it may not be with your regular physician. You may also call the office after 8 am on office days if you need to be seen from an issue the night before. During hours when the office is not normally open, your call will go to the messaging service which cannot provide any service other than paging the doctor. No prescriptions or other nonurgent matters will be handled and no voicemail is available, so please call back during office hours for these matters.        Electronically signed by Peterson London DO on 1/18/2023 at 12:57 PM.

## 2023-01-19 DIAGNOSIS — F51.01 PRIMARY INSOMNIA: ICD-10-CM

## 2023-01-19 DIAGNOSIS — E03.9 ACQUIRED HYPOTHYROIDISM: ICD-10-CM

## 2023-01-19 DIAGNOSIS — E78.00 HYPERCHOLESTEROLEMIA: ICD-10-CM

## 2023-01-19 RX ORDER — LEVOTHYROXINE SODIUM 0.07 MG/1
75 TABLET ORAL DAILY
Qty: 90 TABLET | Refills: 1 | Status: SHIPPED | OUTPATIENT
Start: 2023-01-19

## 2023-01-19 RX ORDER — EZETIMIBE 10 MG/1
10 TABLET ORAL DAILY
Qty: 90 TABLET | Refills: 1 | Status: SHIPPED | OUTPATIENT
Start: 2023-01-19

## 2023-01-19 RX ORDER — HYDROXYZINE PAMOATE 25 MG/1
CAPSULE ORAL
Qty: 120 CAPSULE | Refills: 1 | Status: SHIPPED | OUTPATIENT
Start: 2023-01-19

## 2023-01-19 NOTE — TELEPHONE ENCOUNTER
Requested Prescriptions     Pending Prescriptions Disp Refills    ezetimibe (ZETIA) 10 MG tablet 90 tablet 1     Sig: Take 1 tablet by mouth daily    hydrOXYzine pamoate (VISTARIL) 25 MG capsule 120 capsule 1     Sig: TAKE ONE CAPSULE BY MOUTH FOUR TIMES A DAY    levothyroxine (SYNTHROID) 75 MCG tablet 90 tablet 1     Sig: Take 1 tablet by mouth daily     Patient requesting a medication refill.     Next office visit: Visit date not found    Last regular office visit: 1/18/2023

## 2023-01-24 DIAGNOSIS — E03.9 ACQUIRED HYPOTHYROIDISM: ICD-10-CM

## 2023-01-25 NOTE — TELEPHONE ENCOUNTER
Patient requesting a medication refill.   [facility]  Next office visit: Visit date not found  Last regular office visit: 1/18/2023

## 2023-01-26 RX ORDER — LEVOTHYROXINE SODIUM 0.07 MG/1
TABLET ORAL
Qty: 90 TABLET | Refills: 1 | Status: SHIPPED | OUTPATIENT
Start: 2023-01-26

## 2023-02-16 DIAGNOSIS — K21.9 GASTROESOPHAGEAL REFLUX DISEASE WITHOUT ESOPHAGITIS: ICD-10-CM

## 2023-02-16 RX ORDER — OMEPRAZOLE 20 MG/1
CAPSULE, DELAYED RELEASE ORAL
Qty: 90 CAPSULE | Refills: 1 | Status: SHIPPED | OUTPATIENT
Start: 2023-02-16

## 2023-04-17 ENCOUNTER — TELEMEDICINE (OUTPATIENT)
Dept: PRIMARY CARE CLINIC | Age: 73
End: 2023-04-17
Payer: MEDICARE

## 2023-04-17 DIAGNOSIS — R09.82 POST-NASAL DRIP: Primary | ICD-10-CM

## 2023-04-17 DIAGNOSIS — J06.9 VIRAL URI WITH COUGH: ICD-10-CM

## 2023-04-17 PROCEDURE — 1090F PRES/ABSN URINE INCON ASSESS: CPT | Performed by: FAMILY MEDICINE

## 2023-04-17 PROCEDURE — 1123F ACP DISCUSS/DSCN MKR DOCD: CPT | Performed by: FAMILY MEDICINE

## 2023-04-17 PROCEDURE — G8400 PT W/DXA NO RESULTS DOC: HCPCS | Performed by: FAMILY MEDICINE

## 2023-04-17 PROCEDURE — G8427 DOCREV CUR MEDS BY ELIG CLIN: HCPCS | Performed by: FAMILY MEDICINE

## 2023-04-17 PROCEDURE — 99213 OFFICE O/P EST LOW 20 MIN: CPT | Performed by: FAMILY MEDICINE

## 2023-04-17 PROCEDURE — 3017F COLORECTAL CA SCREEN DOC REV: CPT | Performed by: FAMILY MEDICINE

## 2023-04-17 RX ORDER — BENZONATATE 100 MG/1
100-200 CAPSULE ORAL 3 TIMES DAILY PRN
Qty: 60 CAPSULE | Refills: 0 | Status: SHIPPED | OUTPATIENT
Start: 2023-04-17 | End: 2023-04-24

## 2023-04-17 SDOH — ECONOMIC STABILITY: INCOME INSECURITY: HOW HARD IS IT FOR YOU TO PAY FOR THE VERY BASICS LIKE FOOD, HOUSING, MEDICAL CARE, AND HEATING?: PATIENT DECLINED

## 2023-04-17 SDOH — ECONOMIC STABILITY: HOUSING INSECURITY
IN THE LAST 12 MONTHS, WAS THERE A TIME WHEN YOU DID NOT HAVE A STEADY PLACE TO SLEEP OR SLEPT IN A SHELTER (INCLUDING NOW)?: PATIENT REFUSED

## 2023-04-17 SDOH — ECONOMIC STABILITY: FOOD INSECURITY: WITHIN THE PAST 12 MONTHS, YOU WORRIED THAT YOUR FOOD WOULD RUN OUT BEFORE YOU GOT MONEY TO BUY MORE.: PATIENT DECLINED

## 2023-04-17 SDOH — ECONOMIC STABILITY: FOOD INSECURITY: WITHIN THE PAST 12 MONTHS, THE FOOD YOU BOUGHT JUST DIDN'T LAST AND YOU DIDN'T HAVE MONEY TO GET MORE.: PATIENT DECLINED

## 2023-04-17 ASSESSMENT — ENCOUNTER SYMPTOMS: COUGH: 1

## 2023-04-17 NOTE — PROGRESS NOTES
Normal    [] Abnormal -   Sclera  [x]  Normal    [] Abnormal -          Discharge [x]  None visible   [] Abnormal -     HENT: [x] Normocephalic, atraumatic  [] Abnormal -   [x] Mouth/Throat: Mucous membranes are moist    External Ears [x] Normal  [] Abnormal -    Neck: [x] No visualized mass [] Abnormal -     Pulmonary/Chest: [x] Respiratory effort normal   [x] No visualized signs of difficulty breathing or respiratory distress        [] Abnormal -      Musculoskeletal:   [x] Normal gait with no signs of ataxia         [x] Normal range of motion of neck        [] Abnormal -     Neurological:        [x] No Facial Asymmetry (Cranial nerve 7 motor function) (limited exam due to video visit)          [x] No gaze palsy        [] Abnormal -          Skin:        [x] No significant exanthematous lesions or discoloration noted on facial skin         [] Abnormal -            Psychiatric:       [x] Normal Affect [] Abnormal -        [x] No Hallucinations    Other pertinent observable physical exam findings:-             Natasha OLMEDO Ally, was evaluated through a synchronous (real-time) audio-video encounter. The patient (or guardian if applicable) is aware that this is a billable service, which includes applicable co-pays. This Virtual Visit was conducted with patient's (and/or legal guardian's) consent. The visit was conducted pursuant to the emergency declaration under the [facility], [facility] authority and the [place] [employer] Act. Patient identification was verified, and a caregiver was present when appropriate.    The patient was located at Home: [place]  Provider was located at [facility] [employer] (Appt Dept): [address] [facility],  [address] [facility]         --Cristiana Thompson DO

## 2023-05-01 ENCOUNTER — TELEMEDICINE (OUTPATIENT)
Dept: PRIMARY CARE CLINIC | Age: 73
End: 2023-05-01

## 2023-05-01 DIAGNOSIS — U07.1 COVID-19 VIRUS INFECTION: ICD-10-CM

## 2023-05-01 DIAGNOSIS — J45.909 ACUTE BRONCHITIS WITH ASTHMA: Primary | ICD-10-CM

## 2023-05-01 DIAGNOSIS — J20.9 ACUTE BRONCHITIS WITH ASTHMA: Primary | ICD-10-CM

## 2023-05-01 DIAGNOSIS — E78.00 HYPERCHOLESTEROLEMIA: ICD-10-CM

## 2023-05-01 RX ORDER — METHYLPREDNISOLONE 4 MG/1
TABLET ORAL
Qty: 1 KIT | Refills: 0 | Status: SHIPPED | OUTPATIENT
Start: 2023-05-01 | End: 2023-05-07

## 2023-05-01 RX ORDER — AZITHROMYCIN 250 MG/1
250 TABLET, FILM COATED ORAL SEE ADMIN INSTRUCTIONS
Qty: 6 TABLET | Refills: 0 | Status: SHIPPED | OUTPATIENT
Start: 2023-05-01 | End: 2023-05-06

## 2023-05-01 ASSESSMENT — ENCOUNTER SYMPTOMS
WHEEZING: 0
RHINORRHEA: 0
SORE THROAT: 0
VOMITING: 0
SWOLLEN GLANDS: 0
SPUTUM PRODUCTION: 1
ABDOMINAL PAIN: 0
COUGH: 1
ORTHOPNEA: 0
HEMOPTYSIS: 0
SHORTNESS OF BREATH: 1

## 2023-05-01 NOTE — PROGRESS NOTES
Rickford Seip Voutsikakis (:  1950) is a Established patient, presenting virtually for evaluation of the following:    Assessment & Plan   Below is the assessment and plan developed based on review of pertinent history, physical exam, labs, studies, and medications. 1. Acute bronchitis with asthma after covid 19 infection diagnosed on 23 with a home test.  Persistent cough and some shortness of breath. Good vitamin D levels in chart. Patient will get pulse ox today. Go to ER if 92% or less. Will go to lab in am for the following test.  Give medrol dose pack and z pack for secondary infection with new yellow phlegm. Continue advair.  -     CBC with Auto Differential; Future  -     Comprehensive Metabolic Panel; Future  -     Urinalysis with Microscopic; Future  -     XR CHEST STANDARD (2 VW); Future  -     Misc. Devices (FINGERTIP PULSE OXIMETER) MISC; 3 times daily Starting Mon 2023, Disp-1 each, R-0, Normal  -     azithromycin (ZITHROMAX) 250 MG tablet; Take 1 tablet by mouth See Admin Instructions for 5 days 500mg on day 1 followed by 250mg on days 2 - 5, Disp-6 tablet, R-0Normal  -     methylPREDNISolone (MEDROL DOSEPACK) 4 MG tablet; Take by mouth., Disp-1 kit, R-0Normal  2. COVID-19 virus infection  3. Hypercholesterolemia, intolerant of statin on zetia. Check labs to see if controlled. -     Lipid Panel; Future  -     TSH with Reflex to FT4; Future  Bp 136/101 pulse 80. Repeat covid test is negative. Return in about 1 week (around 2023). In office appointment. Subjective   23 Sore throat. Tested positive on  23 and one week ago. . Patient called for Paxlovid on 23. When message relayed to MD, patient was out of the window for treatment. Since that time continues to cough and now coughing up yellow green phlegm. Some Shortness of breath when try to do house hold chores.   Able to walk to bathroom with no problem and no shortness of breath at rest.  Patient

## 2023-05-02 ENCOUNTER — HOSPITAL ENCOUNTER (OUTPATIENT)
Dept: GENERAL RADIOLOGY | Age: 73
Discharge: HOME | End: 2023-05-02
Payer: MEDICARE

## 2023-05-02 ENCOUNTER — HOSPITAL ENCOUNTER (OUTPATIENT)
Age: 73
Discharge: HOME | End: 2023-05-02
Payer: MEDICARE

## 2023-05-02 DIAGNOSIS — J20.9 ACUTE BRONCHITIS WITH ASTHMA: ICD-10-CM

## 2023-05-02 DIAGNOSIS — J45.909 ACUTE BRONCHITIS WITH ASTHMA: ICD-10-CM

## 2023-05-02 DIAGNOSIS — E78.00 HYPERCHOLESTEROLEMIA: ICD-10-CM

## 2023-05-02 LAB
ALBUMIN SERPL-MCNC: 4.7 G/DL (ref 3.4–5)
ALBUMIN/GLOB SERPL: 1.3 {RATIO} (ref 1.1–2.2)
ALP SERPL-CCNC: 78 U/L (ref 40–129)
ALT SERPL-CCNC: 17 U/L (ref 10–40)
ANION GAP SERPL CALCULATED.3IONS-SCNC: 13 MMOL/L (ref 3–16)
AST SERPL-CCNC: 22 U/L (ref 15–37)
BACTERIA URNS QL MICRO: ABNORMAL /HPF
BASOPHILS # BLD: 0 K/UL (ref 0–0.2)
BASOPHILS NFR BLD: 0.3 %
BILIRUB SERPL-MCNC: 0.3 MG/DL (ref 0–1)
BILIRUB UR QL STRIP.AUTO: NEGATIVE
BUN SERPL-MCNC: 19 MG/DL (ref 7–20)
CALCIUM SERPL-MCNC: 9.9 MG/DL (ref 8.3–10.6)
CHLORIDE SERPL-SCNC: 99 MMOL/L (ref 99–110)
CHOLEST SERPL-MCNC: 290 MG/DL (ref 0–199)
CLARITY UR: CLEAR
CO2 SERPL-SCNC: 25 MMOL/L (ref 21–32)
COLOR UR: YELLOW
CREAT SERPL-MCNC: 1 MG/DL (ref 0.6–1.2)
DEPRECATED RDW RBC AUTO: 13.2 % (ref 12.4–15.4)
EOSINOPHIL # BLD: 0 K/UL (ref 0–0.6)
EOSINOPHIL NFR BLD: 0.1 %
EPI CELLS #/AREA URNS AUTO: 1 /HPF (ref 0–5)
GFR SERPLBLD CREATININE-BSD FMLA CKD-EPI: 59 ML/MIN/{1.73_M2}
GLUCOSE SERPL-MCNC: 89 MG/DL (ref 70–99)
GLUCOSE UR STRIP.AUTO-MCNC: NEGATIVE MG/DL
HCT VFR BLD AUTO: 46.5 % (ref 36–48)
HDLC SERPL-MCNC: 72 MG/DL (ref 40–60)
HGB BLD-MCNC: 15.4 G/DL (ref 12–16)
HGB UR QL STRIP.AUTO: ABNORMAL
HYALINE CASTS #/AREA URNS AUTO: 3 /LPF (ref 0–8)
KETONES UR STRIP.AUTO-MCNC: NEGATIVE MG/DL
LDLC SERPL CALC-MCNC: 180 MG/DL
LEUKOCYTE ESTERASE UR QL STRIP.AUTO: ABNORMAL
LYMPHOCYTES # BLD: 1.2 K/UL (ref 1–5.1)
LYMPHOCYTES NFR BLD: 8.3 %
MCH RBC QN AUTO: 29.5 PG (ref 26–34)
MCHC RBC AUTO-ENTMCNC: 33 G/DL (ref 31–36)
MCV RBC AUTO: 89.2 FL (ref 80–100)
MONOCYTES # BLD: 0.5 K/UL (ref 0–1.3)
MONOCYTES NFR BLD: 3.4 %
NEUTROPHILS # BLD: 13.1 K/UL (ref 1.7–7.7)
NEUTROPHILS NFR BLD: 87.9 %
NITRITE UR QL STRIP.AUTO: NEGATIVE
PH UR STRIP.AUTO: 5.5 [PH] (ref 5–8)
PLATELET # BLD AUTO: 356 K/UL (ref 135–450)
PMV BLD AUTO: 7.9 FL (ref 5–10.5)
POTASSIUM SERPL-SCNC: 4.1 MMOL/L (ref 3.5–5.1)
PROT SERPL-MCNC: 8.2 G/DL (ref 6.4–8.2)
PROT UR STRIP.AUTO-MCNC: NEGATIVE MG/DL
RBC # BLD AUTO: 5.21 M/UL (ref 4–5.2)
RBC CLUMPS #/AREA URNS AUTO: 3 /HPF (ref 0–4)
SODIUM SERPL-SCNC: 137 MMOL/L (ref 136–145)
SP GR UR STRIP.AUTO: 1.01 (ref 1–1.03)
T4 FREE SERPL-MCNC: 1.4 NG/DL (ref 0.9–1.8)
TRIGL SERPL-MCNC: 192 MG/DL (ref 0–150)
TSH SERPL DL<=0.005 MIU/L-ACNC: 5.76 UIU/ML (ref 0.27–4.2)
UA DIPSTICK W REFLEX MICRO PNL UR: YES
URN SPEC COLLECT METH UR: ABNORMAL
UROBILINOGEN UR STRIP-ACNC: 0.2 E.U./DL
VLDLC SERPL CALC-MCNC: 38 MG/DL
WBC # BLD AUTO: 14.9 K/UL (ref 4–11)
WBC #/AREA URNS AUTO: 3 /HPF (ref 0–5)

## 2023-05-02 PROCEDURE — 81001 URINALYSIS AUTO W/SCOPE: CPT

## 2023-05-02 PROCEDURE — 80053 COMPREHEN METABOLIC PANEL: CPT

## 2023-05-02 PROCEDURE — 85025 COMPLETE CBC W/AUTO DIFF WBC: CPT

## 2023-05-02 PROCEDURE — 80061 LIPID PANEL: CPT

## 2023-05-02 PROCEDURE — 71046 X-RAY EXAM CHEST 2 VIEWS: CPT

## 2023-05-02 PROCEDURE — 84439 ASSAY OF FREE THYROXINE: CPT

## 2023-05-02 PROCEDURE — 84443 ASSAY THYROID STIM HORMONE: CPT

## 2023-05-02 PROCEDURE — 36415 COLL VENOUS BLD VENIPUNCTURE: CPT

## 2023-05-02 NOTE — RESULT ENCOUNTER NOTE
Mild patient was sick with COVID she was not taking her thyroid medication regularly but will get back on it regularly now. Reviewed results with patient all her labs. Reviewed normal chest x-ray. We will follow-up in the office in 1 week. Starting to feel better.

## 2023-05-09 ENCOUNTER — OFFICE VISIT (OUTPATIENT)
Dept: PRIMARY CARE CLINIC | Age: 73
End: 2023-05-09

## 2023-05-09 VITALS
DIASTOLIC BLOOD PRESSURE: 92 MMHG | WEIGHT: 149 LBS | SYSTOLIC BLOOD PRESSURE: 148 MMHG | OXYGEN SATURATION: 96 % | HEART RATE: 92 BPM | BODY MASS INDEX: 27.25 KG/M2 | TEMPERATURE: 97.3 F

## 2023-05-09 DIAGNOSIS — E78.00 HYPERCHOLESTEROLEMIA: Primary | ICD-10-CM

## 2023-05-09 DIAGNOSIS — J45.41 MODERATE PERSISTENT ASTHMA WITH ACUTE EXACERBATION: ICD-10-CM

## 2023-05-09 RX ORDER — COLESEVELAM 180 1/1
625 TABLET ORAL 2 TIMES DAILY WITH MEALS
Qty: 180 TABLET | Refills: 1 | Status: SHIPPED | OUTPATIENT
Start: 2023-05-09 | End: 2023-05-12 | Stop reason: SDUPTHER

## 2023-05-09 RX ORDER — CHOLESTYRAMINE 4 G/9G
1 POWDER, FOR SUSPENSION ORAL 2 TIMES DAILY
Qty: 90 PACKET | Refills: 3 | Status: SHIPPED
Start: 2023-05-09 | End: 2023-05-09

## 2023-05-09 ASSESSMENT — PATIENT HEALTH QUESTIONNAIRE - PHQ9
SUM OF ALL RESPONSES TO PHQ QUESTIONS 1-9: 0
1. LITTLE INTEREST OR PLEASURE IN DOING THINGS: 0
SUM OF ALL RESPONSES TO PHQ QUESTIONS 1-9: 0
SUM OF ALL RESPONSES TO PHQ9 QUESTIONS 1 & 2: 0
SUM OF ALL RESPONSES TO PHQ QUESTIONS 1-9: 0
SUM OF ALL RESPONSES TO PHQ QUESTIONS 1-9: 0
2. FEELING DOWN, DEPRESSED OR HOPELESS: 0

## 2023-05-09 NOTE — PATIENT INSTRUCTIONS
Recommend the covid booster in 3 months. Need to get the shingrix series and prevnar 20 for the pneumonia vaccine. Take Breztri sample , 2  inhalations every 12 hours. Hold advair while using the [place] Thin brick. Resume  Advair when completed the [facility] inhaler.

## 2023-05-09 NOTE — PROGRESS NOTES
Seda Canales (:  1950) is a 67 y.o. female,Established patient, here for evaluation of the following chief complaint(s):  Follow-up         ASSESSMENT/PLAN:  1. Hypercholesterolemia with increased cardiovascular risk calculation at 17.7%    No change with zetia 10 mg qd. Patient was intolerant of multiple statins due to muscle pain. Will add welchol 625 mg bid . Lab Results   Component Value Date    CHOL 290 (H) 2023    CHOL 277 (H) 2022    CHOL 282 (H) 2022     Lab Results   Component Value Date    TRIG 192 (H) 2023    TRIG 246 (H) 2022    TRIG 210 (H) 2022     Lab Results   Component Value Date    HDL 72 (H) 2023    HDL 58 2022    HDL 57 2022     Lab Results   Component Value Date    LDLCALC 180 (H) 2023    LDLCALC 170 (H) 2022    LDLCALC 183 (H) 2022     Lab Results   Component Value Date    LABVLDL 38 2023    LABVLDL 49 2022    LABVLDL 42 2022     No results found for: CHOLHDLRATIO  The 10-year ASCVD risk score (Batsheva LANDERS, et al., 2019) is: 17.7%    Values used to calculate the score:      Age: 67 years      Sex: Female      Is Non- : No      Diabetic: No      Tobacco smoker: No      Systolic Blood Pressure: 968 mmHg      Is BP treated: No      HDL Cholesterol: 72 mg/dL      Total Cholesterol: 290 mg/dL    2. Moderate persistent asthma with acute exacerbation. Recent exacerbation with covid 19 infections. Patient had 3 covid vaccines, with the last in 2021. One week ago due to persistent symptoms medrol dose pack given and patient continued on advair. Feeling a better but frequent cough productive of clear small amounts of sputum. Recent CXR was normal.  Patient told to stop advair and given a sample of Bresztri with instructions to take 2 puffs q 12 h.         Immunization History   Administered Date(s) Administered    COVID-19, MODERNA BLUE border, Primary or

## 2023-05-12 ENCOUNTER — TELEPHONE (OUTPATIENT)
Dept: PRIMARY CARE CLINIC | Age: 73
End: 2023-05-12

## 2023-05-12 DIAGNOSIS — E78.00 HYPERCHOLESTEROLEMIA: ICD-10-CM

## 2023-05-12 DIAGNOSIS — E03.9 ACQUIRED HYPOTHYROIDISM: ICD-10-CM

## 2023-05-12 RX ORDER — COLESEVELAM 180 1/1
625 TABLET ORAL 2 TIMES DAILY WITH MEALS
Qty: 180 TABLET | Refills: 1 | Status: CANCELLED | OUTPATIENT
Start: 2023-05-12

## 2023-05-12 RX ORDER — COLESEVELAM 180 1/1
625 TABLET ORAL 2 TIMES DAILY WITH MEALS
Qty: 180 TABLET | Refills: 1 | Status: SHIPPED | OUTPATIENT
Start: 2023-05-12

## 2023-05-12 ASSESSMENT — ENCOUNTER SYMPTOMS
DIFFICULTY BREATHING: 0
CHEST TIGHTNESS: 1
HOARSE VOICE: 0
EYES NEGATIVE: 1
ALLERGIC/IMMUNOLOGIC NEGATIVE: 1
SHORTNESS OF BREATH: 0
WHEEZING: 1
SPUTUM PRODUCTION: 0
COUGH: 1
FREQUENT THROAT CLEARING: 0
HEMOPTYSIS: 0

## 2023-06-06 ENCOUNTER — OFFICE VISIT (OUTPATIENT)
Dept: PRIMARY CARE CLINIC | Age: 73
End: 2023-06-06

## 2023-06-06 VITALS
TEMPERATURE: 97.2 F | HEART RATE: 87 BPM | BODY MASS INDEX: 27.44 KG/M2 | DIASTOLIC BLOOD PRESSURE: 78 MMHG | OXYGEN SATURATION: 97 % | WEIGHT: 150 LBS | SYSTOLIC BLOOD PRESSURE: 118 MMHG

## 2023-06-06 DIAGNOSIS — L30.9 ECZEMA, UNSPECIFIED TYPE: ICD-10-CM

## 2023-06-06 DIAGNOSIS — Z00.00 MEDICARE ANNUAL WELLNESS VISIT, SUBSEQUENT: Primary | ICD-10-CM

## 2023-06-06 DIAGNOSIS — R53.83 OTHER FATIGUE: ICD-10-CM

## 2023-06-06 DIAGNOSIS — Z12.83 SKIN CANCER SCREENING: ICD-10-CM

## 2023-06-06 DIAGNOSIS — Z12.11 COLON CANCER SCREENING: ICD-10-CM

## 2023-06-06 DIAGNOSIS — Z00.00 MEDICARE ANNUAL WELLNESS VISIT, SUBSEQUENT: ICD-10-CM

## 2023-06-06 DIAGNOSIS — M25.532 LEFT WRIST PAIN: ICD-10-CM

## 2023-06-06 RX ORDER — MOMETASONE FUROATE 1 MG/G
CREAM TOPICAL
Qty: 15 G | Refills: 5 | Status: SHIPPED | OUTPATIENT
Start: 2023-06-06

## 2023-06-06 RX ORDER — LORATADINE 10 MG/1
10 TABLET ORAL DAILY
Qty: 30 TABLET | Refills: 5 | Status: SHIPPED | OUTPATIENT
Start: 2023-06-06

## 2023-06-06 ASSESSMENT — ENCOUNTER SYMPTOMS
GASTROINTESTINAL NEGATIVE: 1
COUGH: 0
SHORTNESS OF BREATH: 0
WHEEZING: 0
EYES NEGATIVE: 1
ALLERGIC/IMMUNOLOGIC NEGATIVE: 1

## 2023-06-06 ASSESSMENT — PATIENT HEALTH QUESTIONNAIRE - PHQ9
SUM OF ALL RESPONSES TO PHQ9 QUESTIONS 1 & 2: 0
2. FEELING DOWN, DEPRESSED OR HOPELESS: 0
SUM OF ALL RESPONSES TO PHQ QUESTIONS 1-9: 0
1. LITTLE INTEREST OR PLEASURE IN DOING THINGS: 0
SUM OF ALL RESPONSES TO PHQ QUESTIONS 1-9: 0

## 2023-06-06 ASSESSMENT — LIFESTYLE VARIABLES: HOW OFTEN DO YOU HAVE A DRINK CONTAINING ALCOHOL: NEVER

## 2023-06-06 NOTE — PROGRESS NOTES
Denita Schroeder DO   hydrOXYzine pamoate (VISTARIL) 25 MG capsule TAKE ONE CAPSULE BY MOUTH FOUR TIMES A DAY Yes Denita Schroeder DO   Omega-3 Fatty Acids (FISH OIL) 1000 MG capsule Take 3 capsules by mouth 3 times daily Yes Denita Schroeder DO   fluticasone-salmeterol (ADVIAR) 100-50 MCG/ACT AEPB diskus inhaler Inhale 1 puff into the lungs 2 times daily Yes Itz Vazquez MD   albuterol sulfate HFA (VENTOLIN HFA) 108 (90 Base) MCG/ACT inhaler Inhale 2 puffs into the lungs 4 times daily as needed for Wheezing Yes Rina Moon DO   vitamin D (VITAMIN D3 HIGH POTENCY) 1000 units CAPS Take 2 capsules by mouth daily TAKE ONE CAPSULE BY MOUTH EVERY DAY Yes Reba Barnes MD   Melatonin 3 MG TABS Take 1 tablet by mouth daily Yes Historical Provider, MD   aspirin EC 81 MG EC tablet Take 1 tablet by mouth daily Yes Historical Provider, MD   .Review of Systems   Constitutional:  Negative for fatigue and fever. Fatigue is decreasing. HENT: Negative. Negative for ear pain. Eyes: Negative. Respiratory:  Negative for cough, shortness of breath and wheezing. Copd   Cardiovascular: Negative. Negative for chest pain. Gastrointestinal: Negative. Endocrine: Negative. Genitourinary: Negative. Musculoskeletal: Negative. Negative for myalgias. Allergic/Immunologic: Negative. Neurological: Negative. Negative for headaches. Hematological: Negative. Psychiatric/Behavioral: Negative.          CareTeam (Including outside providers/suppliers regularly involved in providing care):   Patient Care Team:  Rayna Haro MD as PCP - General (Internal Medicine)  [facility]  Merideth Soulier as Consulting Physician (Gynecology)  Zulema Ayala MD as Consulting Physician (Gastroenterology)     Reviewed and updated this visit:  Tobacco  Allergies  Meds  Med Hx  Surg Hx  Soc Hx  Fam Hx

## 2023-06-06 NOTE — PATIENT INSTRUCTIONS
kind of pain reliever, such as acetaminophen (Tylenol). When should you call for help? Call 911 if you have symptoms of a heart attack. These may include:    Chest pain or pressure, or a strange feeling in the chest.     Sweating.     Shortness of breath.     Pain, pressure, or a strange feeling in the back, neck, jaw, or upper belly or in one or both shoulders or arms.     Lightheadedness or sudden weakness.     A fast or irregular heartbeat. After you call 911, the  may tell you to chew 1 adult-strength or 2 to 4 low-dose aspirin. Wait for an ambulance. Do not try to drive yourself. Watch closely for changes in your health, and be sure to contact your doctor if you have any problems. Where can you learn more? Go to http://www.gan.com/ and enter F075 to learn more about \"A Healthy Heart: Care Instructions. \"  Current as of: September 7, 2022               Content Version: 13.6  © 5414-9804 [employer]. Care instructions adapted under license by [facility] ([facility]). If you have questions about a medical condition or this instruction, always ask your healthcare professional. [place] [address] any warranty or liability for your use of this information. Personalized Preventive Plan for Earl Canales - 6/6/2023  Medicare offers a range of preventive health benefits. Some of the tests and screenings are paid in full while other may be subject to a deductible, co-insurance, and/or copay. Some of these benefits include a comprehensive review of your medical history including lifestyle, illnesses that may run in your family, and various assessments and screenings as appropriate. After reviewing your medical record and screening and assessments performed today your provider may have ordered immunizations, labs, imaging, and/or referrals for you.   A list of these orders (if applicable) as well as your Preventive Care list are included within your

## 2023-06-07 LAB
ALBUMIN SERPL-MCNC: 4.4 G/DL (ref 3.4–5)
ANION GAP SERPL CALCULATED.3IONS-SCNC: 18 MMOL/L (ref 3–16)
BASOPHILS # BLD: 0.1 K/UL (ref 0–0.2)
BASOPHILS NFR BLD: 1.3 %
BUN SERPL-MCNC: 19 MG/DL (ref 7–20)
CALCIUM SERPL-MCNC: 9.3 MG/DL (ref 8.3–10.6)
CHLORIDE SERPL-SCNC: 100 MMOL/L (ref 99–110)
CO2 SERPL-SCNC: 22 MMOL/L (ref 21–32)
CREAT SERPL-MCNC: 1.1 MG/DL (ref 0.6–1.2)
DEPRECATED RDW RBC AUTO: 14.2 % (ref 12.4–15.4)
EOSINOPHIL # BLD: 1.5 K/UL (ref 0–0.6)
EOSINOPHIL NFR BLD: 13.2 %
GFR SERPLBLD CREATININE-BSD FMLA CKD-EPI: 53 ML/MIN/{1.73_M2}
GLUCOSE SERPL-MCNC: 96 MG/DL (ref 70–99)
HCT VFR BLD AUTO: 44.9 % (ref 36–48)
HGB BLD-MCNC: 15.1 G/DL (ref 12–16)
LYMPHOCYTES # BLD: 2.3 K/UL (ref 1–5.1)
LYMPHOCYTES NFR BLD: 20.5 %
MCH RBC QN AUTO: 30.5 PG (ref 26–34)
MCHC RBC AUTO-ENTMCNC: 33.6 G/DL (ref 31–36)
MCV RBC AUTO: 90.8 FL (ref 80–100)
MONOCYTES # BLD: 0.9 K/UL (ref 0–1.3)
MONOCYTES NFR BLD: 8.2 %
NEUTROPHILS # BLD: 6.3 K/UL (ref 1.7–7.7)
NEUTROPHILS NFR BLD: 56.8 %
PHOSPHATE SERPL-MCNC: 4.4 MG/DL (ref 2.5–4.9)
PLATELET # BLD AUTO: 249 K/UL (ref 135–450)
PMV BLD AUTO: 9.1 FL (ref 5–10.5)
POTASSIUM SERPL-SCNC: 4.4 MMOL/L (ref 3.5–5.1)
RBC # BLD AUTO: 4.94 M/UL (ref 4–5.2)
SODIUM SERPL-SCNC: 140 MMOL/L (ref 136–145)
TSH SERPL DL<=0.005 MIU/L-ACNC: 3.38 UIU/ML (ref 0.27–4.2)
WBC # BLD AUTO: 11.1 K/UL (ref 4–11)

## 2023-06-12 NOTE — RESULT ENCOUNTER NOTE
The white blood cell count is decreasing, but is still a little elevated and the allergy portion of the white blood cells , called eosinophils, is elevated. I thinks it would be a good idea to see an allergist to identify and get help with your allergies. The kidney function is slightly reduced. Avoid motrin , aleve, ibuprofen. Only take tylenol for an ache or pain. Also sometimes omeprazole can decrease kidney function. Stop the omeprazole and I will change to prescription pepcid for your acid reflux. You take 20 mg of the pepcid twice a day. I will send it to your pharmacy. I would like to get a kidney ultrasound to make sure  the kidneys are draining properly into the bladder. I will place lab orders to repeat the kidney function in one month. Remember to drink 8 glasses of water daily.

## 2023-06-22 ENCOUNTER — PATIENT MESSAGE (OUTPATIENT)
Dept: PRIMARY CARE CLINIC | Age: 73
End: 2023-06-22

## 2023-06-22 NOTE — TELEPHONE ENCOUNTER
From: Amrit Canales  To: Dr. Isabella Cifuentes: 6/22/2023 10:45 AM EDT  Subject: pepcid    I'm not sure why notes say I can  this in [place], [place]. I get most scripts via [employer]. You sent Colesevelam to correct Rx last time. Maybe Office mgr, sent it.

## 2023-07-06 DIAGNOSIS — K22.10 ULCER OF ESOPHAGUS WITHOUT BLEEDING: Primary | ICD-10-CM

## 2023-07-06 DIAGNOSIS — F51.01 PRIMARY INSOMNIA: ICD-10-CM

## 2023-07-06 RX ORDER — HYDROXYZINE PAMOATE 25 MG/1
25 CAPSULE ORAL 4 TIMES DAILY PRN
Qty: 120 CAPSULE | Refills: 4 | Status: SHIPPED | OUTPATIENT
Start: 2023-07-06

## 2023-07-06 RX ORDER — FAMOTIDINE 20 MG/1
20 TABLET, FILM COATED ORAL 2 TIMES DAILY
Qty: 60 TABLET | Refills: 3 | Status: SHIPPED | OUTPATIENT
Start: 2023-07-06

## 2023-07-10 ENCOUNTER — TELEPHONE (OUTPATIENT)
Dept: PRIMARY CARE CLINIC | Age: 73
End: 2023-07-10

## 2023-07-10 DIAGNOSIS — F51.01 PRIMARY INSOMNIA: ICD-10-CM

## 2023-07-10 DIAGNOSIS — K22.10 ULCER OF ESOPHAGUS WITHOUT BLEEDING: ICD-10-CM

## 2023-07-10 RX ORDER — FAMOTIDINE 20 MG/1
20 TABLET, FILM COATED ORAL 2 TIMES DAILY
Qty: 60 TABLET | Refills: 3 | Status: SHIPPED | OUTPATIENT
Start: 2023-07-10

## 2023-07-10 RX ORDER — HYDROXYZINE PAMOATE 25 MG/1
25 CAPSULE ORAL 4 TIMES DAILY PRN
Qty: 120 CAPSULE | Refills: 4 | Status: SHIPPED | OUTPATIENT
Start: 2023-07-10

## 2023-07-10 NOTE — TELEPHONE ENCOUNTER
PT called in stating that the prescriptions for the Famotidine & the Hydroxyzine are to be sent to [facility] in [place] (where they were initially sent)    Please resend.      Best call back number: [phone]

## 2023-07-17 DIAGNOSIS — F51.01 PRIMARY INSOMNIA: ICD-10-CM

## 2023-07-17 DIAGNOSIS — K22.10 ULCER OF ESOPHAGUS WITHOUT BLEEDING: ICD-10-CM

## 2023-07-17 RX ORDER — HYDROXYZINE PAMOATE 25 MG/1
25 CAPSULE ORAL 4 TIMES DAILY PRN
Qty: 120 CAPSULE | Refills: 4 | Status: SHIPPED | OUTPATIENT
Start: 2023-07-17

## 2023-07-17 RX ORDER — FAMOTIDINE 20 MG/1
20 TABLET, FILM COATED ORAL 2 TIMES DAILY
Qty: 60 TABLET | Refills: 3 | Status: SHIPPED | OUTPATIENT
Start: 2023-07-17

## 2023-07-18 DIAGNOSIS — L30.9 ECZEMA, UNSPECIFIED TYPE: ICD-10-CM

## 2023-07-18 RX ORDER — MOMETASONE FUROATE 1 MG/G
CREAM TOPICAL
Qty: 15 G | Refills: 5 | Status: SHIPPED | OUTPATIENT
Start: 2023-07-18

## 2023-07-19 ENCOUNTER — OFFICE VISIT (OUTPATIENT)
Dept: SLEEP MEDICINE | Age: 73
End: 2023-07-19
Payer: MEDICARE

## 2023-07-19 VITALS
WEIGHT: 152.4 LBS | HEIGHT: 62 IN | RESPIRATION RATE: 18 BRPM | BODY MASS INDEX: 28.05 KG/M2 | SYSTOLIC BLOOD PRESSURE: 122 MMHG | DIASTOLIC BLOOD PRESSURE: 80 MMHG | TEMPERATURE: 97.2 F

## 2023-07-19 DIAGNOSIS — G47.19 EXCESSIVE DAYTIME SLEEPINESS: ICD-10-CM

## 2023-07-19 DIAGNOSIS — R53.83 FATIGUE, UNSPECIFIED TYPE: ICD-10-CM

## 2023-07-19 DIAGNOSIS — G47.33 OSA (OBSTRUCTIVE SLEEP APNEA): Primary | ICD-10-CM

## 2023-07-19 DIAGNOSIS — Z87.09 H/O INTRINSIC ASTHMA: ICD-10-CM

## 2023-07-19 PROCEDURE — 1090F PRES/ABSN URINE INCON ASSESS: CPT | Performed by: PSYCHIATRY & NEUROLOGY

## 2023-07-19 PROCEDURE — 99204 OFFICE O/P NEW MOD 45 MIN: CPT | Performed by: PSYCHIATRY & NEUROLOGY

## 2023-07-19 PROCEDURE — 3017F COLORECTAL CA SCREEN DOC REV: CPT | Performed by: PSYCHIATRY & NEUROLOGY

## 2023-07-19 PROCEDURE — 1036F TOBACCO NON-USER: CPT | Performed by: PSYCHIATRY & NEUROLOGY

## 2023-07-19 PROCEDURE — G8417 CALC BMI ABV UP PARAM F/U: HCPCS | Performed by: PSYCHIATRY & NEUROLOGY

## 2023-07-19 PROCEDURE — 1123F ACP DISCUSS/DSCN MKR DOCD: CPT | Performed by: PSYCHIATRY & NEUROLOGY

## 2023-07-19 PROCEDURE — G8427 DOCREV CUR MEDS BY ELIG CLIN: HCPCS | Performed by: PSYCHIATRY & NEUROLOGY

## 2023-07-19 PROCEDURE — G8400 PT W/DXA NO RESULTS DOC: HCPCS | Performed by: PSYCHIATRY & NEUROLOGY

## 2023-07-19 ASSESSMENT — SLEEP AND FATIGUE QUESTIONNAIRES
NECK CIRCUMFERENCE (INCHES): 13.5
ESS TOTAL SCORE: 8
HOW LIKELY ARE YOU TO NOD OFF OR FALL ASLEEP WHILE SITTING AND READING: 1
HOW LIKELY ARE YOU TO NOD OFF OR FALL ASLEEP IN A CAR, WHILE STOPPED FOR A FEW MINUTES IN TRAFFIC: 0
HOW LIKELY ARE YOU TO NOD OFF OR FALL ASLEEP WHILE LYING DOWN TO REST IN THE AFTERNOON WHEN CIRCUMSTANCES PERMIT: 0
HOW LIKELY ARE YOU TO NOD OFF OR FALL ASLEEP WHEN YOU ARE A PASSENGER IN A CAR FOR AN HOUR WITHOUT A BREAK: 2
HOW LIKELY ARE YOU TO NOD OFF OR FALL ASLEEP WHILE SITTING AND TALKING TO SOMEONE: 1
HOW LIKELY ARE YOU TO NOD OFF OR FALL ASLEEP WHILE WATCHING TV: 2
HOW LIKELY ARE YOU TO NOD OFF OR FALL ASLEEP WHILE SITTING INACTIVE IN A PUBLIC PLACE: 1
HOW LIKELY ARE YOU TO NOD OFF OR FALL ASLEEP WHILE SITTING QUIETLY AFTER LUNCH WITHOUT ALCOHOL: 1

## 2023-07-19 ASSESSMENT — ENCOUNTER SYMPTOMS
EYES NEGATIVE: 1
GASTROINTESTINAL NEGATIVE: 1
ALLERGIC/IMMUNOLOGIC NEGATIVE: 1
RESPIRATORY NEGATIVE: 1

## 2023-07-19 NOTE — PROGRESS NOTES
Reviewed: historical medical records       Social History     Socioeconomic History    Marital status:      Spouse name: Not on file    Number of children: Not on file    Years of education: Not on file    Highest education level: Not on file   Occupational History    Not on file   Tobacco Use    Smoking status: Never     Passive exposure: Yes    Smokeless tobacco: Never    Tobacco comments:      smokes   Substance and Sexual Activity    Alcohol use: Yes     Alcohol/week: 3.0 standard drinks     Types: 3 Cans of beer per week    Drug use: No    Sexual activity: Not on file   Other Topics Concern    Not on file   Social History Narrative    Not on file     Social Determinants of Health     Financial Resource Strain: Unknown    Difficulty of Paying Living Expenses: Patient refused   Food Insecurity: Unknown    Worried About Running Out of Food in the Last Year: Patient refused    [address] in the Last Year: Patient refused   Transportation Needs: Unknown    Lack of Transportation (Medical): Not on file    Lack of Transportation (Non-Medical): Patient refused   Physical Activity: Inactive    Days of Exercise per Week: 0 days    Minutes of Exercise per Session: 0 min   Stress: Not on file   Social Connections: Not on file   Intimate Partner Violence: Not on file   Housing Stability: Unknown    Unable to Pay for Housing in the Last Year: Not on file    Number of Places Lived in the Last Year: Not on file    Unstable Housing in the Last Year: Patient refused       Prior to Admission medications    Medication Sig Start Date End Date Taking? Authorizing Provider   mometasone (ELOCON) 0.1 % cream Apply topically daily as needed to itch rash on torso and shoulders.  7/18/23  Yes Merlin Grewal MD   famotidine (PEPCID) 20 MG tablet Take 1 tablet by mouth 2 times daily 7/17/23  Yes Merlin Grewal MD   hydrOXYzine pamoate (VISTARIL) 25 MG capsule Take 1 capsule by mouth 4 times daily as needed

## 2023-07-19 NOTE — PATIENT INSTRUCTIONS
Orders Placed This Encounter   Procedures    Baseline Diagnostic Sleep Study     Standing Status:   Future     Standing Expiration Date:   7/19/2024     Order Specific Question:   Adult or Pediatric     Answer:   Adult Study (>7 Years)     Order Specific Question:   Location For Sleep Study     Answer:   [facility]     Order Specific Question:   Select Sleep Lab Location     [place]   [facility] [place]    Sleep Study with PAP Titration     Standing Status:   Future     Standing Expiration Date:   7/19/2024     Order Specific Question:   Sleep Study Titration Type     Answer:   CPAP     Order Specific Question:   Location For Sleep Study     Answer:   [facility]     Order Specific Question:   Select Sleep Lab Location     Answer:   [facility] [place]

## 2023-07-21 NOTE — PROGRESS NOTES
[facility] ENDOSCOPY COLONOSCOPY PRE-OPERATIVE INSTRUCTIONS    Procedure date_07/27/23________Arrival time___0815_________        Surgery time___0915_________     EGD to be done on that  same day. Clear liquids the day before the procedure. Do not eat or drink anything within 5 hours of your procedure. This includes water chewing gum, mints and ice chips. You may brush your teeth and gargle the morning of your surgery, but do not swallow the water    You may be asked to stop blood thinners such as Coumadin, Plavix, Fragmin, Lovenox, etc., or any anti-inflammatories such as:  Aspirin, Ibuprofen, Advil, Naproxen prior to your procedure. We also ask that you stop any OTC medications such as fish oil, vitamin E, glucosamine, garlic, Multivitamins, COQ 10, etc.    You must make arrangements for a responsible adult to arrive with you and stay in our waiting area during your procedure. They will also need to take you home after your procedure. For your safety you will not be allowed to leave alone or drive yourself home. Also for your safety, it is strongly suggested that someone stay with you the first 24 hours after your procedure. For your comfort, please wear simple loose fitting clothing to the center. Please do not bring valuables. If you have a living will and a durable power of  for healthcare, please bring in a copy.      You will need to bring a photo ID and insurance card    Our goal is to provide you with excellent care so if you have any questions, please contact us at the [facility] at [phone]         Please note these are generalized instructions for all colonoscopy cases, you may be provided with more specific instructions if necessary

## 2023-07-24 ENCOUNTER — ANESTHESIA EVENT (OUTPATIENT)
Dept: ENDOSCOPY | Age: 73
End: 2023-07-24
Payer: MEDICARE

## 2023-07-27 ENCOUNTER — ANESTHESIA (OUTPATIENT)
Dept: ENDOSCOPY | Age: 73
End: 2023-07-27
Payer: MEDICARE

## 2023-07-27 ENCOUNTER — HOSPITAL ENCOUNTER (OUTPATIENT)
Age: 73
Setting detail: OUTPATIENT SURGERY
Discharge: HOME | End: 2023-07-27
Attending: INTERNAL MEDICINE | Admitting: INTERNAL MEDICINE
Payer: MEDICARE

## 2023-07-27 VITALS
SYSTOLIC BLOOD PRESSURE: 118 MMHG | HEART RATE: 66 BPM | DIASTOLIC BLOOD PRESSURE: 80 MMHG | BODY MASS INDEX: 27.38 KG/M2 | RESPIRATION RATE: 14 BRPM | WEIGHT: 148.8 LBS | TEMPERATURE: 97.3 F | OXYGEN SATURATION: 99 % | HEIGHT: 62 IN

## 2023-07-27 DIAGNOSIS — Z86.010 HISTORY OF COLON POLYPS: ICD-10-CM

## 2023-07-27 DIAGNOSIS — R13.19 ESOPHAGEAL DYSPHAGIA: ICD-10-CM

## 2023-07-27 PROCEDURE — 3700000000 HC ANESTHESIA ATTENDED CARE: Performed by: INTERNAL MEDICINE

## 2023-07-27 PROCEDURE — 3700000001 HC ADD 15 MINUTES (ANESTHESIA): Performed by: INTERNAL MEDICINE

## 2023-07-27 PROCEDURE — 7100000011 HC PHASE II RECOVERY - ADDTL 15 MIN: Performed by: INTERNAL MEDICINE

## 2023-07-27 PROCEDURE — 3609027000 HC COLONOSCOPY: Performed by: INTERNAL MEDICINE

## 2023-07-27 PROCEDURE — 7100000010 HC PHASE II RECOVERY - FIRST 15 MIN: Performed by: INTERNAL MEDICINE

## 2023-07-27 PROCEDURE — 2580000003 HC RX 258: Performed by: STUDENT IN AN ORGANIZED HEALTH CARE EDUCATION/TRAINING PROGRAM

## 2023-07-27 PROCEDURE — 2500000003 HC RX 250 WO HCPCS: Performed by: ANESTHESIOLOGY

## 2023-07-27 PROCEDURE — 3609012400 HC EGD TRANSORAL BIOPSY SINGLE/MULTIPLE: Performed by: INTERNAL MEDICINE

## 2023-07-27 PROCEDURE — 2709999900 HC NON-CHARGEABLE SUPPLY: Performed by: INTERNAL MEDICINE

## 2023-07-27 PROCEDURE — 2580000003 HC RX 258: Performed by: ANESTHESIOLOGY

## 2023-07-27 PROCEDURE — 6360000002 HC RX W HCPCS: Performed by: ANESTHESIOLOGY

## 2023-07-27 PROCEDURE — 88305 TISSUE EXAM BY PATHOLOGIST: CPT

## 2023-07-27 RX ORDER — LIDOCAINE HYDROCHLORIDE 20 MG/ML
INJECTION, SOLUTION EPIDURAL; INFILTRATION; INTRACAUDAL; PERINEURAL PRN
Status: DISCONTINUED | OUTPATIENT
Start: 2023-07-27 | End: 2023-07-27 | Stop reason: SDUPTHER

## 2023-07-27 RX ORDER — PROPOFOL 10 MG/ML
INJECTION, EMULSION INTRAVENOUS PRN
Status: DISCONTINUED | OUTPATIENT
Start: 2023-07-27 | End: 2023-07-27 | Stop reason: SDUPTHER

## 2023-07-27 RX ORDER — SODIUM CHLORIDE 9 MG/ML
INJECTION, SOLUTION INTRAVENOUS PRN
Status: DISCONTINUED | OUTPATIENT
Start: 2023-07-27 | End: 2023-07-27 | Stop reason: HOSPADM

## 2023-07-27 RX ORDER — SODIUM CHLORIDE 9 MG/ML
INJECTION, SOLUTION INTRAVENOUS CONTINUOUS PRN
Status: DISCONTINUED | OUTPATIENT
Start: 2023-07-27 | End: 2023-07-27 | Stop reason: SDUPTHER

## 2023-07-27 RX ORDER — SODIUM CHLORIDE 0.9 % (FLUSH) 0.9 %
5-40 SYRINGE (ML) INJECTION PRN
Status: DISCONTINUED | OUTPATIENT
Start: 2023-07-27 | End: 2023-07-27 | Stop reason: HOSPADM

## 2023-07-27 RX ORDER — SODIUM CHLORIDE 0.9 % (FLUSH) 0.9 %
5-40 SYRINGE (ML) INJECTION EVERY 12 HOURS SCHEDULED
Status: DISCONTINUED | OUTPATIENT
Start: 2023-07-27 | End: 2023-07-27 | Stop reason: HOSPADM

## 2023-07-27 RX ADMIN — SODIUM CHLORIDE: 9 INJECTION, SOLUTION INTRAVENOUS at 08:36

## 2023-07-27 RX ADMIN — PROPOFOL 200 MCG/KG/MIN: 10 INJECTION, EMULSION INTRAVENOUS at 09:27

## 2023-07-27 RX ADMIN — LIDOCAINE HYDROCHLORIDE 80 MG: 20 INJECTION, SOLUTION EPIDURAL; INFILTRATION; INTRACAUDAL; PERINEURAL at 09:27

## 2023-07-27 RX ADMIN — PROPOFOL 180 MCG/KG/MIN: 10 INJECTION, EMULSION INTRAVENOUS at 09:33

## 2023-07-27 RX ADMIN — SODIUM CHLORIDE: 9 INJECTION, SOLUTION INTRAVENOUS at 09:24

## 2023-07-27 RX ADMIN — PROPOFOL 80 MG: 10 INJECTION, EMULSION INTRAVENOUS at 09:27

## 2023-07-27 ASSESSMENT — PAIN SCALES - GENERAL
PAINLEVEL_OUTOF10: 0

## 2023-07-27 ASSESSMENT — PAIN - FUNCTIONAL ASSESSMENT: PAIN_FUNCTIONAL_ASSESSMENT: NONE - DENIES PAIN

## 2023-07-27 NOTE — ANESTHESIA POSTPROCEDURE EVALUATION
Department of Anesthesiology  Postprocedure Note    Patient: Yang Jalloh  MRN: [id number]  YOB: 1950  Date of evaluation: 7/27/2023      Procedure Summary     Date: 07/27/23 Room / Location: [address] [place] [address]    Anesthesia Start: 2721 Anesthesia Stop: iRddhi Jasmine    Procedures:       COLONOSCOPY DIAGNOSTIC      EGD BIOPSY Diagnosis:       History of colon polyps      Esophageal dysphagia      (History of colon polyps [Z86.010])      (Esophageal dysphagia [R13.19])    Surgeons: Laurel Gutierrez MD Responsible Provider: Cem Saravia MD    Anesthesia Type: MAC ASA Status: 3          Anesthesia Type: No value filed. Vernell Phase I: Vernell Score: 10    Vernell Phase II: Vernell Score: 9      Anesthesia Post Evaluation    Patient location during evaluation: PACU  Patient participation: complete - patient participated  Level of consciousness: awake  Airway patency: patent  Nausea & Vomiting: no nausea and no vomiting  Cardiovascular status: blood pressure returned to baseline  Respiratory status: acceptable  Hydration status: stable  Comments: Vital signs stable  OK to discharge from Stage I post anesthesia care.   Care transferred from Anesthesiology department on discharge from perioperative area   Multimodal analgesia pain management approach

## 2023-07-27 NOTE — OP NOTE
COLONOSCOPY     Patient: Danny Hoskins MRN: [id number]   YOB: 1950 Age: 67 y.o. Sex: female       Admitting Physician: Ruth Frazier     Primary Care Physician: Adryan Wetzel MD      DATE OF PROCEDURE: 7/27/2023  PROCEDURE: Colonoscopy    PREOPERATIVE DIAGNOSIS: Colon cancer screening. History of colon polyps  HPI: This is a 67y.o. year old female who presents today for colon cancer screening and screening colonoscopy. ENDOSCOPIST: Ruth Frazier MD    POSTOPERATIVE DIAGNOSIS:    1. Diverticulosis  2. Hemorrhoids    PLAN:   1. Surveillance colonoscopy in 10 years, health permitting    INFORMED CONSENT:  Informed consent for colonoscopy was obtained. The benefits and risks including adverse medicine reaction and perforation have been explained. The patient's questions were answered and the patient agreed to proceed. ASA: ASA 2 - Patient with mild systemic disease with no functional limitations     SEDATION: MAC    The patient's vital signs, cardiac status, pulmonary status, abdominal status and mental status were stable for the procedure. The patient's vital signs and respiratory function as monitored by oxygen saturation remained stable. COLON PREPARATION:  The patient was given a split colon preparation and the preparation was adequate. Procedure Details: An anal exam was performed and this was unremarkable. A digital rectal exam was performed and no masses palpated. The Olympus videocolonoscope  was inserted in the rectum and carefully advanced to the cecum as identified by IC valve, crow's foot appearance and appendix. The cecum was photodocumented. The colonoscope was slowly withdrawn and retrograde examination of the colon was carefully performed with inspection around and between folds. The ascending colon and cecum were intubated twice with repeat antegrade and retrograde examination.   Retroflexion in the rectum was not done due to reduced

## 2023-07-27 NOTE — H&P
Gastroenterology Outpatient History and Physical     Patient: Tiesha Ryan MRN: [id number] Sex: female   YOB: 1950 Age: 67 y.o. Location: [address] [place] [address]    Date:7/27/2023  Primary Care Physician: Mca Elliott MD         Patient: Toby Canales    Physician: Sally Thrasher MD    History of Present Illness: Gastroesophageal reflux disease and colon cancer screening  Review of Systems:  Weight Loss: No  Dysphagia: No  Dyspepsia: No  History:  Past Medical History:   Diagnosis Date    Asthma     Chronic kidney disease     GERD (gastroesophageal reflux disease)     Hyperlipidemia     Insomnia     Need for pneumococcal vaccination 1/26/2015    Need for Tdap vaccination 11/23/2012      Past Surgical History:   Procedure Laterality Date    ARM SURGERY      CATARACT REMOVAL WITH IMPLANT Right 10/01/2015    Dr. Gant Cons, COLON, DIAGNOSTIC      EYE SURGERY      bilateral cataract surgery      Social History     Socioeconomic History    Marital status:      Spouse name: None    Number of children: None    Years of education: None    Highest education level: None   Tobacco Use    Smoking status: Never     Passive exposure: Yes    Smokeless tobacco: Never    Tobacco comments:      smokes   Substance and Sexual Activity    Alcohol use:  Yes     Alcohol/week: 3.0 standard drinks     Types: 3 Cans of beer per week     Comment: socially    Drug use: No     Social Determinants of Health     Financial Resource Strain: Unknown    Difficulty of Paying Living Expenses: Patient refused   Food Insecurity: Unknown    Worried About Running Out of Food in the Last Year: Patient refused    Ran Out of Food in the Last Year: Patient refused   Transportation Needs: Unknown    Lack of Transportation (Non-Medical): Patient refused   Physical Activity: Inactive    Days of Exercise per Week: 0 days    Minutes of Exercise per Session: 0 min   Housing

## 2023-07-27 NOTE — DISCHARGE INSTRUCTIONS
Endoscopy Discharge Instructions    Call  @[email or web address]@ with any questions or concerns. You may be drowsy or lightheaded after receiving sedation. DO NOT operate  a vehicle (automobile, bicycle, motorcycle, machinery, or power tools), no  alcoholic beverages, and do not make any important decisions today. Plan on bed rest or quiet relaxation today. Resume normal activities in the morning. Resume normal activity tomorrow unless otherwise advised by your physician. Eat a light first meal, avoiding spicy and fatty foods, then resume normal diet unless you are told otherwise by your physician. If the intravenous medication site is painful, apply warm compresses on the site until the soreness is relieved and elevate the arm above the heart. Call your physician if no improvement  in 2-3 days. POSSIBLE SYMPTOMS TO WATCH:     1. fever (greater than 100) 5. increased abdominal bloating   2. severe pain   6. excessive bleeding   3. nausea and vomiting  7. chest pain   4. chills    8. shortness of breath       Notify Dr Selena Sherwood if these problems occur     Expected as normal and remedies:  Sore throat: use over the counter throat lozenges or gargle with warm salt water. Redness or soreness at the IV site: apply warm compress  Gaseous discomfort: belching or passing flatus (gas). -Await pathology. Liam Deluna MD    With questions or concerns, call  @[email or web address]@ at [phone].

## 2023-07-27 NOTE — OP NOTE
EGD PROCEDURE NOTE         Esophagogastroduodenoscopy Procedure Note     Patient: Christiano Taylor MRN: [id number]   YOB: 1950 Age: 67 y.o. Sex: female       Admitting Physician: Denny Alcaraz     Primary Care Physician: Jason Walters MD      DATE OF PROCEDURE: 7/27/2023  PROCEDURE: Esophagogastroduodenoscopy  INDICATION: This is a 67y.o. year old female who presents today with esophageal reflux. The patient has a history of dysphagia but has not had any since a dilation of a GE junction stricture in 2015. ENDOSCOPIST: Denny Alcaraz MD    POSTOPERATIVE DIAGNOSIS:   1. Patent GE junction ring/stricture  2. Mild erythematous gastropathy, nonspecific and probably not significant, gastric biopsies done    PLAN:   1. Follow-up biopsies; the patient to contact me in 1 week for results  2. Change H2 blocker to PPI daily given history of dysphagia and GE junction stricture    INFORMED CONSENT:  Informed consent for esophagogastoduodenoscopy was obtained. The benefits and risks including adverse medicine reaction have been explained. The patient's questions were answered and the patient agreed to proceed. ASA:  ASA 2 - Patient with mild systemic disease with no functional limitations    SEDATION: MAC     The patient's vital signs, cardiac status, pulmonary status, abdominal status and mental status were stable for the procedure. The patient's vitals signs and respiratory function as monitored by oxygen saturation were stable throughout    Procedure Details: The Olympus videoendoscope was inserted into the mouth and carefully passed into the esophagus, through the stomach and to the distal duodenum. Antegrade and retrograde examination of the upper gi tract was carefully performed. Findings: At the GE junction there is a patent circumferential ring/stricture. Otherwise the esophagus is normal.  The z-line is distinct without lesions or irregularities.   There

## 2023-07-28 NOTE — RESULT ENCOUNTER NOTE
The pathology report did not show the bacteria that causes ulcers. This means you are natural acid reducer and will need to remain on medication to reduce acid production.

## 2023-07-29 NOTE — RESULT ENCOUNTER NOTE
EGD and colonoscopy were done July 27 for chronic gastroesophageal reflux disease and history of colon polyps. The patient has a history of esophageal dysphagia and esophageal stricture which was dilated several years ago and has not had any further dysphagia. EGD showed a GE junction stricture which was patent. Otherwise examination was negative. Colonoscopy showed diverticulosis and hemorrhoids. I recommend that the patient change her H2 blocker to PPI daily given her history of dysphagia and the esophageal stricture.   I recommend  a surveillance colonoscopy in 10 years, health permitting

## 2023-08-01 PROBLEM — K21.9 GASTROESOPHAGEAL REFLUX DISEASE WITHOUT ESOPHAGITIS: Status: ACTIVE | Noted: 2023-08-01

## 2023-08-14 DIAGNOSIS — E03.9 ACQUIRED HYPOTHYROIDISM: ICD-10-CM

## 2023-08-14 NOTE — TELEPHONE ENCOUNTER
----- Message from Ned Samuel sent at 8/14/2023  1:52 PM EDT -----  Subject: Refill Request    QUESTIONS  Name of Medication? loratadine (CLARITIN) 10 MG tablet  Patient-reported dosage and instructions? once daily  How many days do you have left? 0  Preferred Pharmacy? [facility] phone number (if available)? [phone]  ---------------------------------------------------------------------------  --------------,  Name of Medication? levothyroxine (SYNTHROID) 75 MCG tablet  Patient-reported dosage and instructions? once daily  How many days do you have left? 0  Preferred Pharmacy? [facility] phone number (if available)? [phone]  ---------------------------------------------------------------------------  --------------  Raymond HOBSON  What is the best way for the office to contact you? OK to leave message on   voicemail  Preferred Call Back Phone Number? [phone]  ---------------------------------------------------------------------------  --------------  SCRIPT ANSWERS  Relationship to Patient?  Self

## 2023-08-16 RX ORDER — LEVOTHYROXINE SODIUM 0.07 MG/1
75 TABLET ORAL DAILY
Qty: 90 TABLET | Refills: 1 | Status: SHIPPED | OUTPATIENT
Start: 2023-08-16

## 2023-08-21 ENCOUNTER — HOSPITAL ENCOUNTER (OUTPATIENT)
Dept: ULTRASOUND IMAGING | Age: 73
Discharge: HOME | End: 2023-08-21
Attending: INTERNAL MEDICINE
Payer: MEDICARE

## 2023-08-21 DIAGNOSIS — N17.9 AKI (ACUTE KIDNEY INJURY) (HCC): ICD-10-CM

## 2023-08-21 PROCEDURE — 76770 US EXAM ABDO BACK WALL COMP: CPT

## 2023-08-23 NOTE — RESULT ENCOUNTER NOTE
The kidney ultrasound shows normal kidney tissue and no kidney stones and no obstruction to urine flow. Continue to stay well-hydrated by drinking 6 to 8 glasses of water a day.

## 2023-09-05 DIAGNOSIS — K21.9 GASTROESOPHAGEAL REFLUX DISEASE WITHOUT ESOPHAGITIS: ICD-10-CM

## 2023-09-05 DIAGNOSIS — E78.00 HYPERCHOLESTEROLEMIA: ICD-10-CM

## 2023-09-05 RX ORDER — OMEPRAZOLE 20 MG/1
CAPSULE, DELAYED RELEASE ORAL
Qty: 90 CAPSULE | Refills: 1 | OUTPATIENT
Start: 2023-09-05

## 2023-09-05 RX ORDER — EZETIMIBE 10 MG/1
10 TABLET ORAL DAILY
Qty: 90 TABLET | Refills: 3 | Status: SHIPPED | OUTPATIENT
Start: 2023-09-05

## 2023-09-05 NOTE — TELEPHONE ENCOUNTER
----- Message from Sg Thomas sent at 9/5/2023 10:13 AM EDT -----  Subject: Refill Request    QUESTIONS  Name of Medication? Other - omeprazole (PRILOSEC) 20 MG delayed release   capsule   Patient-reported dosage and instructions? one a day  How many days do you have left? 4  Preferred Pharmacy? [facility] phone number (if available)? [phone]  ---------------------------------------------------------------------------  --------------,  Name of Medication? ezetimibe (ZETIA) 10 MG tablet  Patient-reported dosage and instructions? one a day  How many days do you have left? 2  Preferred Pharmacy? [facility] phone number (if available)? [phone]  ---------------------------------------------------------------------------  --------------  Filemon HOBSON  What is the best way for the office to contact you? OK to leave message on   voicemail  Preferred Call Back Phone Number? [phone]  ---------------------------------------------------------------------------  --------------  SCRIPT ANSWERS  Relationship to Patient?  Self

## 2023-09-11 ENCOUNTER — NURSE ONLY (OUTPATIENT)
Dept: PRIMARY CARE CLINIC | Age: 73
End: 2023-09-11

## 2023-09-11 DIAGNOSIS — Z23 NEED FOR ZOSTER VACCINE: Primary | ICD-10-CM

## 2023-09-18 ENCOUNTER — TELEPHONE (OUTPATIENT)
Dept: PRIMARY CARE CLINIC | Age: 73
End: 2023-09-18

## 2023-09-19 DIAGNOSIS — N18.31 STAGE 3A CHRONIC KIDNEY DISEASE (HCC): Primary | ICD-10-CM

## 2023-09-19 DIAGNOSIS — K21.9 GASTROESOPHAGEAL REFLUX DISEASE WITHOUT ESOPHAGITIS: ICD-10-CM

## 2023-09-19 RX ORDER — OMEPRAZOLE 20 MG/1
CAPSULE, DELAYED RELEASE ORAL
Qty: 90 CAPSULE | Refills: 1 | Status: SHIPPED | OUTPATIENT
Start: 2023-09-19

## 2023-09-19 NOTE — PROGRESS NOTES
Discussed with patient on Pepcid is not working as well as omeprazole. Plan is to reorder omeprazole but repeat kidney function. Pepcid was started because this is more kidney friendly with slightly reduced GFR at 53. Patient will go in for repeat renal profile and not back to normal will refer to nephrology.   Reviewed with patient recent kidney ultrasound that was normal.

## 2023-09-19 NOTE — TELEPHONE ENCOUNTER
Spoke with patient and Pepcid is not working as well as omeprazole. Omeprazole sent into [facility]. Explained to patient Pepcid was given a trial due to reduced kidney function that is mild. Stable renal function in June, but instructed patient to have a repeat kidney blood tests this month and if renal function not back to normal will consult nephrology.   Pepcid removed from medication list.

## 2023-10-16 ENCOUNTER — TELEPHONE (OUTPATIENT)
Dept: PRIMARY CARE CLINIC | Age: 73
End: 2023-10-16

## 2023-10-16 NOTE — TELEPHONE ENCOUNTER
----- Message from Alex Jay sent at 10/16/2023  2:19 PM EDT -----  Subject: Message to Provider    QUESTIONS  Information for Provider? Patient cannot remember what test the doctor   would like her to get. Please reach out to patient.  ---------------------------------------------------------------------------  --------------  [address]  [phone]; OK to leave message on voicemail  ---------------------------------------------------------------------------  --------------  SCRIPT ANSWERS  Relationship to Patient?  Self

## 2023-10-25 ENCOUNTER — OFFICE VISIT (OUTPATIENT)
Dept: PRIMARY CARE CLINIC | Age: 73
End: 2023-10-25

## 2023-10-25 VITALS
BODY MASS INDEX: 28.71 KG/M2 | DIASTOLIC BLOOD PRESSURE: 80 MMHG | OXYGEN SATURATION: 97 % | WEIGHT: 156 LBS | TEMPERATURE: 97.1 F | HEART RATE: 79 BPM | SYSTOLIC BLOOD PRESSURE: 122 MMHG | HEIGHT: 62 IN

## 2023-10-25 DIAGNOSIS — N17.9 AKI (ACUTE KIDNEY INJURY) (HCC): ICD-10-CM

## 2023-10-25 DIAGNOSIS — L30.9 ECZEMA, UNSPECIFIED TYPE: ICD-10-CM

## 2023-10-25 DIAGNOSIS — Z23 NEEDS FLU SHOT: Primary | ICD-10-CM

## 2023-10-25 DIAGNOSIS — D49.2 SKIN NEOPLASM: ICD-10-CM

## 2023-10-25 DIAGNOSIS — N18.32 STAGE 3B CHRONIC KIDNEY DISEASE (HCC): ICD-10-CM

## 2023-10-25 DIAGNOSIS — J45.40 MODERATE PERSISTENT ASTHMA, UNSPECIFIED WHETHER COMPLICATED: ICD-10-CM

## 2023-10-25 DIAGNOSIS — N18.31 STAGE 3A CHRONIC KIDNEY DISEASE (HCC): ICD-10-CM

## 2023-10-25 LAB
ALBUMIN SERPL-MCNC: 4.6 G/DL (ref 3.4–5)
ANION GAP SERPL CALCULATED.3IONS-SCNC: 13 MMOL/L (ref 3–16)
BUN SERPL-MCNC: 22 MG/DL (ref 7–20)
CALCIUM SERPL-MCNC: 9.6 MG/DL (ref 8.3–10.6)
CHLORIDE SERPL-SCNC: 105 MMOL/L (ref 99–110)
CO2 SERPL-SCNC: 26 MMOL/L (ref 21–32)
CREAT SERPL-MCNC: 1.4 MG/DL (ref 0.6–1.2)
GFR SERPLBLD CREATININE-BSD FMLA CKD-EPI: 40 ML/MIN/{1.73_M2}
GLUCOSE SERPL-MCNC: 84 MG/DL (ref 70–99)
PHOSPHATE SERPL-MCNC: 4 MG/DL (ref 2.5–4.9)
POTASSIUM SERPL-SCNC: 4.3 MMOL/L (ref 3.5–5.1)
SODIUM SERPL-SCNC: 144 MMOL/L (ref 136–145)

## 2023-10-25 RX ORDER — FAMOTIDINE 20 MG/1
20 TABLET, FILM COATED ORAL NIGHTLY
Qty: 90 TABLET | Refills: 3
Start: 2023-10-25

## 2023-10-25 RX ORDER — LORATADINE 10 MG/1
10 TABLET ORAL DAILY
Qty: 30 TABLET | Refills: 5 | Status: SHIPPED | OUTPATIENT
Start: 2023-10-25

## 2023-10-25 NOTE — PROGRESS NOTES
Elena Canales (:  1950) is a 67 y.o. female,Established patient, here for evaluation of the following chief complaint(s):  Follow-up                 Lucy leigh for thyroid  ASSESSMENT/PLAN:  1. Needs flu shot  -     Influenza, FLUAD, (age 72 y+), IM, Preservative Free, 0.5 mL  2. Eczema, unspecified type and chronic itching, take the famotidine 20 mg bid and loratadine 10 mg qd. Keep skin lubricated. Normal LFT's in may and had itching at that time. CKD 3b but not severe enough to cause itching. Will get allergy blood test to see if there is an environmental agent  or food that is the culprit  -     loratadine (CLARITIN) 10 MG tablet; Take 1 tablet by mouth daily, Disp-30 tablet, R-5Normal  3. Skin neoplasm, two areas, see pics, refer to surgeon to remove. -     Noe Solis MD, General Surgery, [facility]  4. Moderate persistent asthma, unspecified whether complicated, stable on advair, continue. -     Allergen, Respiratory, Region 5 Panel; Future  -     Allergen, Food, Comprehensive Profile 1; Future  5. Stage 3b chronic kidney disease ([address]), on PPI, will reduce to qod, restart famotidine. It did not control symptoms before, but maybe cause PPI was stopped abruptly. Very concern that PPI may be a factor in reduced renal funtion. Mychart message and referral placed  for nephrology, after review of renal results from today. -     Renal Function Panel; Future  -     AFL - Joann Bowens MD, Nephrology, [facility]  6. JORGE (acute kidney injury) [facility]): same as in problem #5/   -     Deana Nascimento MD, Nephrology, [facility]      Return in about 5 months (around 3/25/2024) for on 3/11/24 when get the shingrix booster, make sure office visit appointment. .         Subjective   SUBJECTIVE/OBJECTIVE:  Torso itching. Rash  This is a chronic problem. Episode onset: at least 4 months off and on. The problem has been waxing and waning since onset.

## 2023-10-26 NOTE — RESULT ENCOUNTER NOTE
I am concerned that the kidney function has decreased. It is still good but it should not decrease. I am sending you to a kidney specialist at [facility] to make sure we are doing everything we should to keep your kidneys healthy. Remember do not take any Motrin Advil Aleve ibuprofen naproxen and no prescription anti-inflammatories. Only Tylenol is safe. Call the number below for an appointment.       [facility], [address] [facility], [address]  Phone: [phone]

## 2023-10-29 ASSESSMENT — ENCOUNTER SYMPTOMS
SHORTNESS OF BREATH: 0
FREQUENT THROAT CLEARING: 0
WHEEZING: 0
HEMOPTYSIS: 0
COUGH: 0
HEARTBURN: 0
DIFFICULTY BREATHING: 0
GASTROINTESTINAL NEGATIVE: 1
SORE THROAT: 0
TROUBLE SWALLOWING: 0
EYE PAIN: 0
DIARRHEA: 0
RHINORRHEA: 0
ALLERGIC/IMMUNOLOGIC NEGATIVE: 1
SPUTUM PRODUCTION: 0
CHEST TIGHTNESS: 0
HOARSE VOICE: 0
NAIL CHANGES: 0
VOMITING: 0
EYES NEGATIVE: 1

## 2023-11-05 LAB
A ALTERNATA IGE QN: <0.1 KU/L (ref 0–0.34)
A FUMIGATUS IGE QN: <0.1 KU/L (ref 0–0.34)
AMER SYCAMORE IGE QN: 0.31 KU/L (ref 0–0.34)
BERMUDA GRASS IGE QN: 0.3 KU/L (ref 0–0.34)
BOXELDER IGE QN: 0.37 KU/L (ref 0–0.34)
C SPHAEROSPERMUM IGE QN: <0.1 KUL/L (ref 0–0.34)
CALIF WALNUT IGE QN: 0.28 KU/L (ref 0–0.34)
CAT DANDER IGE QN: 0.4 KU/L (ref 0–0.34)
CMN PIGWEED IGE QN: 0.38 KU/L (ref 0–0.34)
COMMON RAGWEED IGE QN: 0.3 KU/L (ref 0–0.34)
COTTONWOOD IGE QN: 0.32 KU/L (ref 0–0.34)
D FARINAE IGE QN: 1.82 KU/L (ref 0–0.34)
D PTERONYSS IGE QN: 2.69 KU/L (ref 0–0.34)
DOG DANDER IGE QN: 2.1 KU/L (ref 0–0.34)
IGE SERPL-ACNC: 364 IU/ML
M RACEMOSUS IGE QN: 0.22 KU/L (ref 0–0.34)
MOUSE EPITH IGE QN: <0.1 KU/L (ref 0–0.34)
P NOTATUM IGE QN: 0.12 KU/L (ref 0–0.34)
PECAN/HICK TREE IGE QN: 0.25 KU/L (ref 0–0.34)
RED CEDAR IGE QN: 0.25 KU/L (ref 0–0.34)
ROACH IGE QN: 0.23 KU/L (ref 0–0.34)
SALTWORT IGE QN: 0.51 KU/L (ref 0–0.34)
SHEEP SORREL IGE QN: 0.48 KU/L (ref 0–0.34)
SILVER BIRCH IGE QN: 0.22 KU/L (ref 0–0.34)
TIMOTHY IGE QN: 0.44 KU/L (ref 0–0.34)
WHITE ASH IGE QN: 0.36 KU/L (ref 0–0.34)
WHITE ELM IGE QN: 0.49 KU/L (ref 0–0.34)
WHITE MULBERRY IGE QN: 0.19 KU/L (ref 0–0.34)
WHITE OAK IGE QN: 0.35 KU/L (ref 0–0.34)

## 2023-11-06 LAB
BARLEY IGE QN: 5.35
BEEF IGE QN: <0.1
BELL PEPPER IGE QN: 0.27
CABBAGE IGE QN: 0.38
CARROT IGE QN: 0.23
CHICKEN SERUM PROT IGE QN: <0.1
CODFISH IGE QN: <0.1
CORN IGE QN: 0.3
COW MILK IGE QN: 0.62
CRAB IGE QN: 0.11
EGG WHITE IGE QN: 0.21
GRAPE IGE QN: 0.24
IGE SERPL-ACNC: 364 [IU]/L
LETTUCE IGE QN: 0.25
OAT IGE QN: 0.29
ORANGE IGE QN: 0.24
PEANUT IGE QN: 0.46
PORK IGE QN: <0.1
POTATO IGE QN: 0.46
RICE IGE QN: 0.31
RYE IGE QN: 1.57
SHRIMP IGE QN: 0.12
SOYBEAN IGE QN: 0.2
TOMATO IGE QN: 0.37
TUNA IGE QN: <0.1
WHEAT IGE QN: 1.09
WHITE BEAN IGE QN: 0.3

## 2023-11-08 NOTE — RESULT ENCOUNTER NOTE
No significant food allergies , but you are allergic to a lot of trees and grasses. It is too much to avoid  stay on f the loratadine 10 mg daily and the famotidine 20 mg nightly until your return visit. Lubricate your skin daily in the involved areas with Eucerine.

## 2023-11-22 DIAGNOSIS — N18.32 STAGE 3B CHRONIC KIDNEY DISEASE (HCC): ICD-10-CM

## 2023-11-22 LAB
ALBUMIN SERPL-MCNC: 4.7 G/DL (ref 3.4–5)
ALBUMIN/GLOB SERPL: 1.8 {RATIO} (ref 1.1–2.2)
ALP SERPL-CCNC: 86 U/L (ref 40–129)
ALT SERPL-CCNC: 20 U/L (ref 10–40)
ANION GAP SERPL CALCULATED.3IONS-SCNC: 10 MMOL/L (ref 3–16)
AST SERPL-CCNC: 19 U/L (ref 15–37)
BILIRUB SERPL-MCNC: <0.2 MG/DL (ref 0–1)
BUN SERPL-MCNC: 26 MG/DL (ref 7–20)
CALCIUM SERPL-MCNC: 9.7 MG/DL (ref 8.3–10.6)
CHLORIDE SERPL-SCNC: 105 MMOL/L (ref 99–110)
CO2 SERPL-SCNC: 26 MMOL/L (ref 21–32)
CREAT SERPL-MCNC: 1.1 MG/DL (ref 0.6–1.2)
EOSINOPHIL NFR UR MANUAL: 41 % OF WBC
EOSINOPHIL URNS QL MICRO: NORMAL
GFR SERPLBLD CREATININE-BSD FMLA CKD-EPI: 53 ML/MIN/{1.73_M2}
GLUCOSE SERPL-MCNC: 82 MG/DL (ref 70–99)
PHOSPHATE SERPL-MCNC: 3.6 MG/DL (ref 2.5–4.9)
POTASSIUM SERPL-SCNC: 5 MMOL/L (ref 3.5–5.1)
SODIUM SERPL-SCNC: 141 MMOL/L (ref 136–145)
SODIUM UR-SCNC: 30 MMOL/L
URATE SERPL-MCNC: 6.1 MG/DL (ref 2.6–6)

## 2023-11-24 LAB
ALBUMIN SERPL ELPH-MCNC: 3.7 G/DL (ref 3.1–4.9)
ALPHA1 GLOB SERPL ELPH-MCNC: 0.2 G/DL (ref 0.2–0.4)
ALPHA2 GLOB SERPL ELPH-MCNC: 0.9 G/DL (ref 0.4–1.1)
B-GLOBULIN SERPL ELPH-MCNC: 1.1 G/DL (ref 0.9–1.6)
GAMMA GLOB SERPL ELPH-MCNC: 1.3 G/DL (ref 0.6–1.8)
KAPPA LC FREE SER-MCNC: 23.12 MG/L (ref 3.3–19.4)
KAPPA LC FREE/LAMBDA FREE SER: 1.21 {RATIO} (ref 0.26–1.65)
LAMBDA LC FREE SERPL-MCNC: 19.03 MG/L (ref 5.71–26.3)
PROT SERPL-MCNC: 7.3 G/DL (ref 6.4–8.2)
RPT COMMENT: ABNORMAL
SPE/IFE INTERPRETATION: NORMAL

## 2024-01-05 ENCOUNTER — CLINICAL DOCUMENTATION ONLY (OUTPATIENT)
Facility: CLINIC | Age: 74
End: 2024-01-05

## 2024-01-08 ENCOUNTER — TELEPHONE (OUTPATIENT)
Dept: PRIMARY CARE CLINIC | Age: 74
End: 2024-01-08

## 2024-01-08 NOTE — TELEPHONE ENCOUNTER
Care Transitions Initial Follow Up Call    Outreach made within 2 business days of discharge: Yes    Patient: Natasha Canales Patient : 1950   MRN: [id number]  Reason for Admission: There are no discharge diagnoses documented for the most recent discharge.  Discharge Date: 23       Spoke with: Natasha    Discharge department/facility: [facility] Interactive Patient Contact:  Was patient able to fill all prescriptions: Yes  Was patient instructed to bring all medications to the follow-up visit: Yes  Is patient taking all medications as directed in the discharge summary? Yes  Does patient understand their discharge instructions: Yes  Does patient have questions or concerns that need addressed prior to 7-14 day follow up office visit: no    Scheduled appointment with PCP within 7-14 days    Follow Up  Future Appointments   Date Time Provider Department Center   2024  1:00 PM Jael Ashraf MD [address]  [place]   3/25/2024  1:40 PM Jael Ashraf MD [address]  [place]  SACHIN Hayden MA    Made an appointment for 2024 with Dr Ashraf    ----- Message from Kelli Alcala sent at 2024 10:47 AM EST -----  Subject: Hospital Follow Up    QUESTIONS  What hospital was the Patient Discharged from? [facility]   Date of Discharge? 2024  Discharge Location? Home  Reason for hospitalization as patient stated? Nausa, vomiting, Covid   Positive   What is the best way for the office to contact you? OK to leave message on   voicemail  Preferred Call Back Phone Number? [phone]

## 2024-01-12 ENCOUNTER — CLINICAL DOCUMENTATION ONLY (OUTPATIENT)
Facility: CLINIC | Age: 74
End: 2024-01-12

## 2024-01-16 RX ORDER — FLUTICASONE PROPIONATE AND SALMETEROL 100; 50 UG/1; UG/1
1 POWDER RESPIRATORY (INHALATION) 2 TIMES DAILY
Qty: 60 EACH | Refills: 12 | Status: SHIPPED | OUTPATIENT
Start: 2024-01-16 | End: 2024-01-17 | Stop reason: SDUPTHER

## 2024-01-16 NOTE — TELEPHONE ENCOUNTER
Patient called in for refill on     fluticasone-salmeterol (ADVIAR) 100-50 MCG/ACT AEPB diskus inhaler   Please send to this pharmacy   [facility] - [address] - P [phone] - F [phone] [[id number]]

## 2024-01-16 NOTE — TELEPHONE ENCOUNTER
Medication:   Requested Prescriptions     Pending Prescriptions Disp Refills    fluticasone-salmeterol (ADVAIR) 100-50 MCG/ACT AEPB diskus inhaler 60 each 12     Sig: Inhale 1 puff into the lungs 2 times daily        Last Filled:      Patient Phone Number: [phone] (home)     Last appt: 10/25/2023   Next appt: 1/17/2024    Last OARRS:        No data to display

## 2024-01-17 ENCOUNTER — OFFICE VISIT (OUTPATIENT)
Dept: PRIMARY CARE CLINIC | Age: 74
End: 2024-01-17

## 2024-01-17 VITALS
BODY MASS INDEX: 28.66 KG/M2 | HEART RATE: 92 BPM | OXYGEN SATURATION: 98 % | WEIGHT: 151.8 LBS | TEMPERATURE: 97 F | RESPIRATION RATE: 16 BRPM | DIASTOLIC BLOOD PRESSURE: 82 MMHG | HEIGHT: 61 IN | SYSTOLIC BLOOD PRESSURE: 128 MMHG

## 2024-01-17 DIAGNOSIS — Z09 HOSPITAL DISCHARGE FOLLOW-UP: ICD-10-CM

## 2024-01-17 DIAGNOSIS — J45.41 MODERATE PERSISTENT ASTHMA WITH ACUTE EXACERBATION: ICD-10-CM

## 2024-01-17 DIAGNOSIS — R11.2 NAUSEA AND VOMITING, UNSPECIFIED VOMITING TYPE: ICD-10-CM

## 2024-01-17 DIAGNOSIS — E78.00 HYPERCHOLESTEROLEMIA: ICD-10-CM

## 2024-01-17 DIAGNOSIS — R11.2 NAUSEA AND VOMITING, UNSPECIFIED VOMITING TYPE: Primary | ICD-10-CM

## 2024-01-17 DIAGNOSIS — N18.31 STAGE 3A CHRONIC KIDNEY DISEASE (HCC): ICD-10-CM

## 2024-01-17 DIAGNOSIS — N18.32 STAGE 3B CHRONIC KIDNEY DISEASE (HCC): ICD-10-CM

## 2024-01-17 PROBLEM — J10.1 INFLUENZA A: Status: ACTIVE | Noted: 2024-01-07

## 2024-01-17 PROBLEM — U07.1 COVID-19: Status: ACTIVE | Noted: 2024-01-07

## 2024-01-17 LAB
BASOPHILS # BLD: 0.1 K/UL (ref 0–0.2)
BASOPHILS NFR BLD: 1.2 %
DEPRECATED RDW RBC AUTO: 13.9 % (ref 12.4–15.4)
EOSINOPHIL # BLD: 0.6 K/UL (ref 0–0.6)
EOSINOPHIL NFR BLD: 6.1 %
HCT VFR BLD AUTO: 44.9 % (ref 36–48)
HGB BLD-MCNC: 14.7 G/DL (ref 12–16)
LYMPHOCYTES # BLD: 2.1 K/UL (ref 1–5.1)
LYMPHOCYTES NFR BLD: 20.8 %
MCH RBC QN AUTO: 28.1 PG (ref 26–34)
MCHC RBC AUTO-ENTMCNC: 32.7 G/DL (ref 31–36)
MCV RBC AUTO: 86 FL (ref 80–100)
MONOCYTES # BLD: 0.9 K/UL (ref 0–1.3)
MONOCYTES NFR BLD: 9.3 %
NEUTROPHILS # BLD: 6.4 K/UL (ref 1.7–7.7)
NEUTROPHILS NFR BLD: 62.6 %
PLATELET # BLD AUTO: 392 K/UL (ref 135–450)
PMV BLD AUTO: 8.6 FL (ref 5–10.5)
RBC # BLD AUTO: 5.22 M/UL (ref 4–5.2)
WBC # BLD AUTO: 10.2 K/UL (ref 4–11)

## 2024-01-17 RX ORDER — FLUTICASONE PROPIONATE AND SALMETEROL 100; 50 UG/1; UG/1
1 POWDER RESPIRATORY (INHALATION) 2 TIMES DAILY
Qty: 60 EACH | Refills: 12 | Status: SHIPPED | OUTPATIENT
Start: 2024-01-17

## 2024-01-17 ASSESSMENT — PATIENT HEALTH QUESTIONNAIRE - PHQ9
SUM OF ALL RESPONSES TO PHQ9 QUESTIONS 1 & 2: 2
SUM OF ALL RESPONSES TO PHQ QUESTIONS 1-9: 2
1. LITTLE INTEREST OR PLEASURE IN DOING THINGS: 1
2. FEELING DOWN, DEPRESSED OR HOPELESS: 1
SUM OF ALL RESPONSES TO PHQ QUESTIONS 1-9: 2

## 2024-01-17 NOTE — PROGRESS NOTES
Natasha Canales (:  1950) is a 73 y.o. female,Established patient, here for evaluation of the following chief complaint(s):  Follow-Up from Hospital     Contains abnormal data SARS-COV2-FLU A/B  Specimen: Swab - Both anterior nares (body structure)  Component  Ref Range & Units 13 d ago   CORONAVIRUS 2019-SARS-COV-2  Not Detected Detected Abnormal    Influenza A DNA  Not Detected Detected Abnormal    Influenza B DNA  Not Detected Not Detected      LIPASE LEVEL  Specimen: Blood - Venous blood (substance)  Component  Ref Range & Units 11 d ago   Lipase Lvl  13 - 60 U/L 65 High      ASSESSMENT/PLAN:  {There are no diagnoses linked to this encounter. (Refresh or delete this SmartLink)}    No follow-ups on file.         Subjective   SUBJECTIVE/OBJECTIVE:  [facility]    Review of Systems       Objective   Physical Exam       On this date 2024 I have spent *** minutes reviewing previous notes, test results and face to face with the patient discussing the diagnosis and importance of compliance with the treatment plan as well as documenting on the day of the visit.      An electronic signature was used to authenticate this note.    --CARINE KULKARNI MD

## 2024-01-18 ENCOUNTER — TELEPHONE (OUTPATIENT)
Dept: PRIMARY CARE CLINIC | Age: 74
End: 2024-01-18

## 2024-01-18 DIAGNOSIS — K85.90 ACUTE PANCREATITIS WITHOUT INFECTION OR NECROSIS, UNSPECIFIED PANCREATITIS TYPE: Primary | ICD-10-CM

## 2024-01-18 LAB
ALBUMIN SERPL-MCNC: 4.6 G/DL (ref 3.4–5)
ALBUMIN/GLOB SERPL: 1.7 {RATIO} (ref 1.1–2.2)
ALP SERPL-CCNC: 83 U/L (ref 40–129)
ALT SERPL-CCNC: 29 U/L (ref 10–40)
ANION GAP SERPL CALCULATED.3IONS-SCNC: 12 MMOL/L (ref 3–16)
AST SERPL-CCNC: 20 U/L (ref 15–37)
BILIRUB SERPL-MCNC: <0.2 MG/DL (ref 0–1)
BUN SERPL-MCNC: 20 MG/DL (ref 7–20)
CALCIUM SERPL-MCNC: 9.3 MG/DL (ref 8.3–10.6)
CHLORIDE SERPL-SCNC: 100 MMOL/L (ref 99–110)
CO2 SERPL-SCNC: 25 MMOL/L (ref 21–32)
CREAT SERPL-MCNC: 1 MG/DL (ref 0.6–1.2)
GFR SERPLBLD CREATININE-BSD FMLA CKD-EPI: 59 ML/MIN/{1.73_M2}
GLUCOSE SERPL-MCNC: 108 MG/DL (ref 70–99)
LIPASE SERPL-CCNC: 98 U/L (ref 13–60)
PHOSPHATE SERPL-MCNC: 3.6 MG/DL (ref 2.5–4.9)
POTASSIUM SERPL-SCNC: 4.4 MMOL/L (ref 3.5–5.1)
PROT SERPL-MCNC: 7.3 G/DL (ref 6.4–8.2)
SODIUM SERPL-SCNC: 137 MMOL/L (ref 136–145)

## 2024-01-18 NOTE — TELEPHONE ENCOUNTER
Spoke with nephrology and reviewed the below patient history    Copd,  acute kidney injury in October 2023  with gfr decrease to 40.   Med list reviewed.  Never nsaid, PPI stopped , given pepcid  Renal us no obstructive uropathy.  Unfortunately famotidine 20 mg twice a day did not control GERD symptoms very well.    Brief hospitalization for covid/ influenza  infection and ppi restarted.  During the COVID influenza infection patient had severe nausea and vomiting and was dehydrated.  In interim ppi restart, but  creat decreased to 0.8.   patient told [facility] that concern for ppi renal damage was not true.   After discussing with nephrology there is still a very small concern of PPIs causing kidney injury and very infrequent less than 1%.  Is important that I have monitored the patient closely since the PPI was restarted.  Will check renal profile in 1 month.      I also discussed with patient elevated lipase of 98 with normal up to 60.  12 days ago during hospitalization lipase was slightly elevated 65.  Is concerning with the increase in the lipase.  Will rule out pancreatitis versus pancreatic mass and with nausea and vomiting possibility of penetrating ulcer.  Get CT of abdomen and pelvis with contrast.  Patient informed and will schedule.      Will need ct abdomen pelvis with iv contrast

## 2024-01-18 NOTE — RESULT ENCOUNTER NOTE
Spoke with nephrology and reviewed the below patient history    Copd,  acute kidney injury in October 2023  with gfr decrease to 40.   Med list reviewed.  Never nsaid, PPI stopped , given pepcid  Renal us no obstructive uropathy.  Unfortunately famotidine 20 mg twice a day did not control GERD symptoms very well.    Brief hospitalization for covid/ influenza  infection and ppi restarted.  During the COVID influenza infection patient had severe nausea and vomiting and was dehydrated.  In interim ppi restart, but  creat decreased to 0.8.   patient told [facility] that concern for ppi renal damage was not true.   After discussing with nephrology there is still a very small concern of PPIs causing kidney injury and very infrequent less than 1%.  Is important that I have monitored the patient closely since the PPI was restarted.  Will check renal profile in 1 month.      I also discussed with patient elevated lipase of 98 with normal up to 60.  12 days ago during hospitalization lipase was slightly elevated 65.  Is concerning with the increase in the lipase.  Will rule out pancreatitis versus pancreatic mass and with nausea and vomiting possibility of penetrating ulcer.  Get CT of abdomen and pelvis with contrast.  Patient informed and will schedule.

## 2024-01-19 ASSESSMENT — ENCOUNTER SYMPTOMS
RHINORRHEA: 0
SHORTNESS OF BREATH: 0
EYES NEGATIVE: 1
WHEEZING: 0
SORE THROAT: 0
VOMITING: 0
DIARRHEA: 0
EYE PAIN: 0
GASTROINTESTINAL NEGATIVE: 1
TROUBLE SWALLOWING: 0
ALLERGIC/IMMUNOLOGIC NEGATIVE: 1
COUGH: 0

## 2024-01-19 NOTE — PROGRESS NOTES
Post-Discharge Transitional Care Management Services      Natasha Canales   YOB: 1950    Date of Visit:  1/17/2024  30 Day Post-Discharge Date: went to ED on 1/3.24 for dehydration from covid 19 and influenza infection and returned on 1/6/24 for generalized weakness and admitted from 1/6/24 to 1/8/24.    Allergies   Allergen Reactions    Hydrocodone Nausea And Vomiting    Famotidine Other (See Comments)     chills    Statins      Outpatient Medications Marked as Taking for the 1/17/24 encounter (Office Visit) with Jael Ashraf MD   Medication Sig Dispense Refill    fluticasone-salmeterol (ADVAIR) 100-50 MCG/ACT AEPB diskus inhaler Inhale 1 puff into the lungs 2 times daily 60 each 12    omeprazole (PRILOSEC) 20 MG delayed release capsule TAKE ONE CAPSULE BY MOUTH DAILY, discontinue pepcid. 90 capsule 1    ezetimibe (ZETIA) 10 MG tablet Take 1 tablet by mouth daily 90 tablet 3    levothyroxine (SYNTHROID) 75 MCG tablet Take 1 tablet by mouth daily 90 tablet 1    mometasone (ELOCON) 0.1 % cream Apply topically daily as needed to itch rash on torso and shoulders. 15 g 5    Misc. Devices (FINGERTIP PULSE OXIMETER) MISC 1 Device by Does not apply route in the morning, at noon, and at bedtime 1 each 0    albuterol sulfate HFA (VENTOLIN HFA) 108 (90 Base) MCG/ACT inhaler Inhale 2 puffs into the lungs 4 times daily as needed for Wheezing 3 Inhaler 1    vitamin D (VITAMIN D3 HIGH POTENCY) 1000 units CAPS Take 2 capsules by mouth daily TAKE ONE CAPSULE BY MOUTH EVERY  capsule 3    aspirin EC 81 MG EC tablet Take 1 tablet by mouth daily           Vitals:    01/17/24 1307   BP: 128/82   Pulse: 92   Resp: 16   Temp: 97 °F (36.1 °C)   TempSrc: Tympanic   SpO2: 98%   Weight: 68.9 kg (151 lb 12.8 oz)   Height: 1.549 m (5' 1\")     Body mass index is 28.68 kg/m².     Wt Readings from Last 3 Encounters:   01/17/24 68.9 kg (151 lb 12.8 oz)   10/25/23 70.8 kg (156 lb)   07/27/23 67.5 kg (148

## 2024-01-25 ENCOUNTER — PATIENT MESSAGE (OUTPATIENT)
Dept: PRIMARY CARE CLINIC | Age: 74
End: 2024-01-25

## 2024-01-25 NOTE — TELEPHONE ENCOUNTER
From: Natasha Canales  To: Dr. Jael Ashraf  Sent: 1/25/2024 11:37 AM EST  Subject: flu shot    Did I get a flu shot at your office?

## 2024-01-29 ENCOUNTER — HOSPITAL ENCOUNTER (OUTPATIENT)
Dept: CT IMAGING | Age: 74
Discharge: HOME | End: 2024-01-29
Attending: INTERNAL MEDICINE
Payer: MEDICARE

## 2024-01-29 DIAGNOSIS — K85.90 ACUTE PANCREATITIS WITHOUT INFECTION OR NECROSIS, UNSPECIFIED PANCREATITIS TYPE: ICD-10-CM

## 2024-01-29 LAB
PERFORMED ON: ABNORMAL
POC CREATININE: 1 MG/DL (ref 0.6–1.2)
POC SAMPLE TYPE: ABNORMAL

## 2024-01-29 PROCEDURE — 82565 ASSAY OF CREATININE: CPT

## 2024-01-29 PROCEDURE — 74177 CT ABD & PELVIS W/CONTRAST: CPT

## 2024-01-29 PROCEDURE — 6360000004 HC RX CONTRAST MEDICATION: Performed by: INTERNAL MEDICINE

## 2024-01-29 RX ADMIN — IOPAMIDOL 75 ML: 755 INJECTION, SOLUTION INTRAVENOUS at 13:07

## 2024-01-31 DIAGNOSIS — Z79.899 LONG-TERM CURRENT USE OF PROTON PUMP INHIBITOR THERAPY: Primary | ICD-10-CM

## 2024-01-31 NOTE — RESULT ENCOUNTER NOTE
Stable kidney blood test we should check again in a month.  This will watch the kidneys carefully while you are taking the acid reflux medication.  I will place orders

## 2024-02-02 ENCOUNTER — TELEPHONE (OUTPATIENT)
Dept: PRIMARY CARE CLINIC | Age: 74
End: 2024-02-02

## 2024-02-02 DIAGNOSIS — R16.0 LIVER MASS: Primary | ICD-10-CM

## 2024-02-02 DIAGNOSIS — R74.8 ELEVATED LIPASE: ICD-10-CM

## 2024-02-02 DIAGNOSIS — K86.89 PANCREATIC MASS: ICD-10-CM

## 2024-02-02 DIAGNOSIS — K75.81 NASH (NONALCOHOLIC STEATOHEPATITIS): ICD-10-CM

## 2024-02-02 NOTE — RESULT ENCOUNTER NOTE
The CT of the abdomen shows a fullness or swelling in the pancreas.  The CT could not tell if it was a mass or just inflammation.  They recommend an MRI with contrast to make sure there is not a mass or tumor in the pancreas.  The other finding was a small mass in the liver and you do have mild fatty liver.  When the MRI of the pancreas is done it will also make sure that the mass in the liver to see if it is suspicious for cancer.  The CAT scan shows a fatty liver.  Fatty liver is manage with a low-fat diet and regular exercise and is something that needs to be followed to make sure it does not evolve into cirrhosis.  That is unlikely for you because your fatty liver is mild and not severe.  Lifestyle changes should manage it well.  Also avoid alcohol intake if you do occasionally drink alcohol.  The gastroenterologist is the specialist who would biopsy mass in the pancreas or liver if needed.  I will place the referral now for you to make an appointment to go over all the results to see the neck step after you have the MRI.    I also saw you are trying to get in touch with billing so I will send the email to our  to have them reach out to you.  I will call you as well to see if you have any questions.

## 2024-02-12 ENCOUNTER — TELEPHONE (OUTPATIENT)
Dept: PRIMARY CARE CLINIC | Age: 74
End: 2024-02-12

## 2024-02-12 NOTE — TELEPHONE ENCOUNTER
----- Message from Bertha Gerard sent at 2/9/2024 10:29 AM EST -----  Subject: Message to Provider    QUESTIONS  Information for Provider? Patient had the shingles vaccination during   09/2023. [employer] and [employer] told her this vaccination was not covered   under Medicare. When she spoke to the office, they said it will cost $100.   She agreed and had the vaccination. Since then she has received a bill for   $287. Please call to discuss.  ---------------------------------------------------------------------------  --------------  CALL BACK INFO  [phone]; OK to leave message on voicemail  ---------------------------------------------------------------------------  --------------  SCRIPT ANSWERS  Relationship to Patient? Self

## 2024-02-15 NOTE — TELEPHONE ENCOUNTER
Called patient and verified her insurance carrier are Medicare A & B plus a [employer] is her secondary, she stated yes; however, patient is very upset because is being billed for shingles vaccine done here at [facility]; however prior to getting the vaccine done, patient stated she called 09/2023 & and gentleman in office told her that the cost of shingles would be $100 and she receiving a bill for $287. She stated that because someone in the office told her this she should not have to pay the charge of $287 for this vaccine.

## 2024-02-16 PROBLEM — J10.1 INFLUENZA A: Status: RESOLVED | Noted: 2024-01-07 | Resolved: 2024-02-16

## 2024-02-29 DIAGNOSIS — N18.31 STAGE 3A CHRONIC KIDNEY DISEASE (HCC): Primary | ICD-10-CM

## 2024-02-29 DIAGNOSIS — Z79.899 LONG-TERM CURRENT USE OF PROTON PUMP INHIBITOR THERAPY: ICD-10-CM

## 2024-02-29 DIAGNOSIS — K85.90 ACUTE PANCREATITIS WITHOUT INFECTION OR NECROSIS, UNSPECIFIED PANCREATITIS TYPE: ICD-10-CM

## 2024-02-29 DIAGNOSIS — N18.31 STAGE 3A CHRONIC KIDNEY DISEASE (HCC): ICD-10-CM

## 2024-02-29 LAB
ALBUMIN SERPL-MCNC: 4.5 G/DL (ref 3.4–5)
ANION GAP SERPL CALCULATED.3IONS-SCNC: 11 MMOL/L (ref 3–16)
BUN SERPL-MCNC: 21 MG/DL (ref 7–20)
CALCIUM SERPL-MCNC: 9.8 MG/DL (ref 8.3–10.6)
CHLORIDE SERPL-SCNC: 102 MMOL/L (ref 99–110)
CO2 SERPL-SCNC: 26 MMOL/L (ref 21–32)
CREAT SERPL-MCNC: 1.2 MG/DL (ref 0.6–1.2)
GFR SERPLBLD CREATININE-BSD FMLA CKD-EPI: 47 ML/MIN/{1.73_M2}
GLUCOSE SERPL-MCNC: 91 MG/DL (ref 70–99)
LIPASE SERPL-CCNC: 43 U/L (ref 13–60)
MAGNESIUM SERPL-MCNC: 2 MG/DL (ref 1.8–2.4)
PHOSPHATE SERPL-MCNC: 4.3 MG/DL (ref 2.5–4.9)
POTASSIUM SERPL-SCNC: 3.7 MMOL/L (ref 3.5–5.1)
SODIUM SERPL-SCNC: 139 MMOL/L (ref 136–145)

## 2024-03-01 ENCOUNTER — PATIENT MESSAGE (OUTPATIENT)
Dept: PRIMARY CARE CLINIC | Age: 74
End: 2024-03-01

## 2024-03-01 DIAGNOSIS — R74.8 ELEVATED LIPASE: Primary | ICD-10-CM

## 2024-03-01 DIAGNOSIS — K86.89 PANCREATIC MASS: ICD-10-CM

## 2024-03-01 LAB
ALBUMIN SERPL ELPH-MCNC: 2.7 G/DL (ref 3.1–4.9)
ALPHA1 GLOB SERPL ELPH-MCNC: 0.3 G/DL (ref 0.2–0.4)
ALPHA2 GLOB SERPL ELPH-MCNC: 1.1 G/DL (ref 0.4–1.1)
B-GLOBULIN SERPL ELPH-MCNC: 1.6 G/DL (ref 0.9–1.6)
GAMMA GLOB SERPL ELPH-MCNC: 1.8 G/DL (ref 0.6–1.8)
PROT SERPL-MCNC: 7.4 G/DL (ref 6.4–8.2)

## 2024-03-01 NOTE — TELEPHONE ENCOUNTER
From: Natasha Canales  To: Dr. Jael Ashraf  Sent: 3/1/2024 10:10 AM EST  Subject: MRI    , when I went to schedule MRI the agent said the order needs to put in with and without contrast. Please correct. I saw Dr. Weinstein yesterday. Thank you

## 2024-03-06 LAB — SPE/IFE INTERPRETATION: NORMAL

## 2024-03-13 DIAGNOSIS — E03.9 ACQUIRED HYPOTHYROIDISM: ICD-10-CM

## 2024-03-13 NOTE — TELEPHONE ENCOUNTER
Medication:   Requested Prescriptions     Pending Prescriptions Disp Refills    levothyroxine (SYNTHROID) 75 MCG tablet 90 tablet 1     Sig: Take 1 tablet by mouth daily      Last appt: 1/17/2024   Next appt: 4/17/2024

## 2024-03-14 RX ORDER — LEVOTHYROXINE SODIUM 0.07 MG/1
75 TABLET ORAL DAILY
Qty: 90 TABLET | Refills: 1 | Status: SHIPPED | OUTPATIENT
Start: 2024-03-14

## 2024-03-25 DIAGNOSIS — L30.9 ECZEMA, UNSPECIFIED TYPE: ICD-10-CM

## 2024-03-25 RX ORDER — MOMETASONE FUROATE 1 MG/G
CREAM TOPICAL
Qty: 15 G | Refills: 5 | Status: SHIPPED | OUTPATIENT
Start: 2024-03-25

## 2024-03-25 NOTE — TELEPHONE ENCOUNTER
Medication:   Requested Prescriptions     Pending Prescriptions Disp Refills    mometasone (ELOCON) 0.1 % cream 15 g 5     Sig: Apply topically daily as needed to itch rash on torso and shoulders.      Patient Phone Number: [phone] (home)     Last appt: 1/17/2024   Next appt: 4/17/2024

## 2024-03-26 ENCOUNTER — HOSPITAL ENCOUNTER (OUTPATIENT)
Dept: MRI IMAGING | Age: 74
Discharge: HOME | End: 2024-03-26
Attending: INTERNAL MEDICINE
Payer: MEDICARE

## 2024-03-26 DIAGNOSIS — K86.89 PANCREATIC MASS: ICD-10-CM

## 2024-03-26 DIAGNOSIS — R74.8 ELEVATED LIPASE: ICD-10-CM

## 2024-03-26 PROCEDURE — 6360000004 HC RX CONTRAST MEDICATION: Performed by: INTERNAL MEDICINE

## 2024-03-26 PROCEDURE — 74183 MRI ABD W/O CNTR FLWD CNTR: CPT

## 2024-03-26 PROCEDURE — A9579 GAD-BASE MR CONTRAST NOS,1ML: HCPCS | Performed by: INTERNAL MEDICINE

## 2024-03-26 RX ADMIN — GADOTERIDOL 14 ML: 279.3 INJECTION, SOLUTION INTRAVENOUS at 17:17

## 2024-03-29 NOTE — RESULT ENCOUNTER NOTE
The MRI showed fatty liver, a hemangioma of the liver.  A hemangioma is a blood vessel ball that is common and does not cause problems and does not require follow up.  There were benign kidney cysts that do not require follow up. The pancreas did not show a mass as suggested on the CT.  That is good news.  Also review the results with the liver specialist.   Even though you do not feel constipated, it does show on the MRI.  Take miralax or metamucil daily indefinitely to keep the bowls healthy.  Eat a high fiber diet such as lots of fruits and vegetables and a high fiber cereal in the am.

## 2024-04-02 ENCOUNTER — OFFICE VISIT (OUTPATIENT)
Dept: PRIMARY CARE CLINIC | Age: 74
End: 2024-04-02

## 2024-04-02 VITALS
SYSTOLIC BLOOD PRESSURE: 122 MMHG | DIASTOLIC BLOOD PRESSURE: 84 MMHG | BODY MASS INDEX: 29.3 KG/M2 | OXYGEN SATURATION: 94 % | HEIGHT: 61 IN | TEMPERATURE: 97.2 F | HEART RATE: 87 BPM | WEIGHT: 155.2 LBS | RESPIRATION RATE: 16 BRPM

## 2024-04-02 DIAGNOSIS — L02.213 CUTANEOUS ABSCESS OF CHEST WALL: Primary | ICD-10-CM

## 2024-04-02 RX ORDER — AMOXICILLIN AND CLAVULANATE POTASSIUM 875; 125 MG/1; MG/1
1 TABLET, FILM COATED ORAL 2 TIMES DAILY
Qty: 20 TABLET | Refills: 0 | Status: SHIPPED | OUTPATIENT
Start: 2024-04-02 | End: 2024-04-12

## 2024-04-02 ASSESSMENT — ENCOUNTER SYMPTOMS
GASTROINTESTINAL NEGATIVE: 1
EYES NEGATIVE: 1
DIARRHEA: 0
VOMITING: 0
EYE PAIN: 0
SHORTNESS OF BREATH: 0
RHINORRHEA: 0
WHEEZING: 0
COUGH: 0
TROUBLE SWALLOWING: 0
SORE THROAT: 0
ALLERGIC/IMMUNOLOGIC NEGATIVE: 1

## 2024-04-02 NOTE — PROGRESS NOTES
Natasha Canales (:  1950) is a 73 y.o. female,Established patient, here for evaluation of the following chief complaint(s):  3 Month Follow-Up and Mass (On chest- sore, growing )         ASSESSMENT/PLAN:            1. Cutaneous abscess of chest wall, start antibiotics to cover strept and staph,  no history of MRSA, refer for I&D to prevent recurrence.  -     amoxicillin-clavulanate (AUGMENTIN) 875-125 MG per tablet; Take 1 tablet by mouth 2 times daily for 10 days, Disp-20 tablet, R-0Normal  -     Max Núñez MD, General Surgery, [facility]      Return in about 2 months (around 2024) for shingle and eval kidneys.         Subjective   SUBJECTIVE/OBJECTIVE:  Abscess: Patient presents for evaluation of a cutaneous abscess. Lesion is located in the anterior chest wall over the sternal area. Onset was yesterday ago. Symptoms have gradually worsened. Abscess has associated symptoms of pain, but denies fever or chills. Patient does not have previous history of cutaneous abscesses. Patient does not have diabetes.           Review of Systems   Constitutional:  Negative for appetite change, fatigue and fever.   HENT: Negative.  Negative for congestion, ear pain, postnasal drip, rhinorrhea, sore throat and trouble swallowing.    Eyes: Negative.  Negative for pain.   Respiratory:  Negative for cough, shortness of breath and wheezing.         Copd      Covid 19 infection  23 and 24       Cardiovascular:  Negative for chest pain.   Gastrointestinal: Negative.  Negative for diarrhea and vomiting.        Colonoscopy 23 with Dr. Weinstein:    1.  Diverticulosis  2.  Hemorrhoids     PLAN:   1.  Surveillance colonoscopy in 10 years, health permitting        Genitourinary:  Negative for difficulty urinating and menstrual problem.   Musculoskeletal: Negative.  Negative for myalgias.   Skin:  Negative for rash.   Allergic/Immunologic: Negative.         Dog Dander IgE  0.00 - 0.34

## 2024-04-04 ENCOUNTER — PATIENT MESSAGE (OUTPATIENT)
Dept: PRIMARY CARE CLINIC | Age: 74
End: 2024-04-04

## 2024-04-04 DIAGNOSIS — L30.9 ECZEMA, UNSPECIFIED TYPE: ICD-10-CM

## 2024-04-04 NOTE — TELEPHONE ENCOUNTER
From: Natasha Canales  To: Dr. Jael Ashraf  Sent: 4/4/2024 12:35 PM EDT  Subject: claritin    I think I took claritin off my med list and I want to include it again. Please send script to [facility] for a refill. Thank you

## 2024-04-04 NOTE — TELEPHONE ENCOUNTER
Medication:   Requested Prescriptions     Pending Prescriptions Disp Refills    loratadine (CLARITIN) 10 MG tablet 30 tablet 5     Sig: Take 1 tablet by mouth daily        Last Filled:      Patient Phone Number: [phone] (home)     Last appt: 4/2/2024   Next appt: 6/3/2024    Last OARRS:        No data to display

## 2024-04-05 RX ORDER — LORATADINE 10 MG/1
10 TABLET ORAL DAILY
Qty: 30 TABLET | Refills: 5 | Status: SHIPPED | OUTPATIENT
Start: 2024-04-05

## 2024-04-22 ENCOUNTER — INITIAL CONSULT (OUTPATIENT)
Dept: SURGERY | Age: 74
End: 2024-04-22
Payer: MEDICARE

## 2024-04-22 DIAGNOSIS — L72.3 INFECTED SEBACEOUS CYST: Primary | ICD-10-CM

## 2024-04-22 DIAGNOSIS — L08.9 INFECTED SEBACEOUS CYST: Primary | ICD-10-CM

## 2024-04-22 PROCEDURE — G8417 CALC BMI ABV UP PARAM F/U: HCPCS | Performed by: SURGERY

## 2024-04-22 PROCEDURE — 1090F PRES/ABSN URINE INCON ASSESS: CPT | Performed by: SURGERY

## 2024-04-22 PROCEDURE — 99202 OFFICE O/P NEW SF 15 MIN: CPT | Performed by: SURGERY

## 2024-04-22 PROCEDURE — 1123F ACP DISCUSS/DSCN MKR DOCD: CPT | Performed by: SURGERY

## 2024-04-22 PROCEDURE — 3017F COLORECTAL CA SCREEN DOC REV: CPT | Performed by: SURGERY

## 2024-04-22 PROCEDURE — G8427 DOCREV CUR MEDS BY ELIG CLIN: HCPCS | Performed by: SURGERY

## 2024-04-22 PROCEDURE — G8400 PT W/DXA NO RESULTS DOC: HCPCS | Performed by: SURGERY

## 2024-04-22 PROCEDURE — 1036F TOBACCO NON-USER: CPT | Performed by: SURGERY

## 2024-04-22 NOTE — PROGRESS NOTES
Insecurity: Not on file (4/17/2023)   Transportation Needs: Unknown (4/17/2023)    PRAPARE - Transportation     Lack of Transportation (Medical): Not on file     Lack of Transportation (Non-Medical): Patient declined   Physical Activity: Inactive (6/6/2023)    Exercise Vital Sign     Days of Exercise per Week: 0 days     Minutes of Exercise per Session: 0 min   Stress: Not on file   Social Connections: Not on file   Intimate Partner Violence: Not on file   Housing Stability: Unknown (4/17/2023)    Housing Stability Vital Sign     Unable to Pay for Housing in the Last Year: Not on file     Number of Places Lived in the Last Year: Not on file     Unstable Housing in the Last Year: Patient refused          There were no vitals filed for this visit.  There is no height or weight on file to calculate BMI.     Wt Readings from Last 3 Encounters:   04/02/24 70.4 kg (155 lb 3.2 oz)   01/17/24 68.9 kg (151 lb 12.8 oz)   10/25/23 70.8 kg (156 lb)     BP Readings from Last 3 Encounters:   04/02/24 122/84   01/17/24 128/82   10/25/23 122/80          REVIEW OF SYSTEMS:   Pertinent positives are in HPI, otherwise all systems reviewed and negative    PHYSICAL EXAM:    CONSTITUTIONAL:  awake, alert, no apparent distress and normal weight  ENT:  normocephalic, without obvious abnormality  NECK:  supple, symmetrical, trachea midline   LUNGS:  Resp easy and unlabored  CARDIOVASCULAR:  regular rate and rhythm  MUSCULOSKELETAL: No edema  NEUROLOGIC:  Mental Status Exam:  Level of Alertness:   awake  Orientation:   person, place, time  SKIN: anterior chest with approximate 1 x 1.5 cm sebaceous cyst with 1 cm healing eschar, no active drainage        ASSESSMENT:     Diagnosis Orders   1. Infected sebaceous cyst              PLAN:    Ms. Canales has a resolving infected sebaceous cyst of the anterior chest.  It has spontaneously drained.  I recommended excision in 2-3 weeks in the office to prevent future infections.  She is going to

## 2024-04-25 DIAGNOSIS — E03.9 ACQUIRED HYPOTHYROIDISM: ICD-10-CM

## 2024-05-02 RX ORDER — LEVOTHYROXINE SODIUM 88 UG/1
88 TABLET ORAL DAILY
Qty: 90 TABLET | Refills: 0 | Status: SHIPPED | OUTPATIENT
Start: 2024-05-02

## 2024-05-07 ENCOUNTER — PATIENT MESSAGE (OUTPATIENT)
Dept: PRIMARY CARE CLINIC | Age: 74
End: 2024-05-07

## 2024-05-07 DIAGNOSIS — E03.9 ACQUIRED HYPOTHYROIDISM: ICD-10-CM

## 2024-05-07 RX ORDER — LEVOTHYROXINE SODIUM 88 UG/1
88 TABLET ORAL DAILY
Qty: 90 TABLET | Refills: 0 | Status: SHIPPED | OUTPATIENT
Start: 2024-05-07

## 2024-05-07 NOTE — TELEPHONE ENCOUNTER
From: Natasha Canales  To: Dr. Jael Ashraf  Sent: 5/7/2024 12:07 PM EDT  Subject: levothyroxine    Please send prescription to [facility]. Thank you.

## 2024-05-07 NOTE — TELEPHONE ENCOUNTER
Medication:   Requested Prescriptions     Pending Prescriptions Disp Refills    levothyroxine (SYNTHROID) 88 MCG tablet 90 tablet 0     Sig: Take 1 tablet by mouth daily        Last Filled:      Patient Phone Number: [phone] (home)     Last appt: 4/2/2024   Next appt: 6/3/2024    Last OARRS:        No data to display

## 2024-05-30 ENCOUNTER — TELEPHONE (OUTPATIENT)
Dept: VASCULAR SURGERY | Age: 74
End: 2024-05-30

## 2024-05-30 NOTE — TELEPHONE ENCOUNTER
Patient feels like the cyst is no inflamed and she would like it removed fully. Scheduled for 6-6 12 NOON. Patient aware.

## 2024-06-03 ENCOUNTER — OFFICE VISIT (OUTPATIENT)
Dept: PRIMARY CARE CLINIC | Age: 74
End: 2024-06-03
Payer: MEDICARE

## 2024-06-03 VITALS
WEIGHT: 152.8 LBS | BODY MASS INDEX: 28.85 KG/M2 | DIASTOLIC BLOOD PRESSURE: 78 MMHG | SYSTOLIC BLOOD PRESSURE: 132 MMHG | RESPIRATION RATE: 16 BRPM | HEART RATE: 92 BPM | TEMPERATURE: 97.1 F | HEIGHT: 61 IN | OXYGEN SATURATION: 98 %

## 2024-06-03 DIAGNOSIS — N18.31 STAGE 3A CHRONIC KIDNEY DISEASE (HCC): ICD-10-CM

## 2024-06-03 DIAGNOSIS — K22.11 ULCER OF ESOPHAGUS WITH BLEEDING: ICD-10-CM

## 2024-06-03 DIAGNOSIS — E03.9 ACQUIRED HYPOTHYROIDISM: Primary | ICD-10-CM

## 2024-06-03 DIAGNOSIS — E78.00 HYPERCHOLESTEROLEMIA: ICD-10-CM

## 2024-06-03 DIAGNOSIS — Z13.1 DIABETES MELLITUS SCREENING: ICD-10-CM

## 2024-06-03 DIAGNOSIS — Z13.220 SCREENING CHOLESTEROL LEVEL: ICD-10-CM

## 2024-06-03 PROCEDURE — G8427 DOCREV CUR MEDS BY ELIG CLIN: HCPCS | Performed by: INTERNAL MEDICINE

## 2024-06-03 PROCEDURE — G8417 CALC BMI ABV UP PARAM F/U: HCPCS | Performed by: INTERNAL MEDICINE

## 2024-06-03 PROCEDURE — 1123F ACP DISCUSS/DSCN MKR DOCD: CPT | Performed by: INTERNAL MEDICINE

## 2024-06-03 PROCEDURE — G8400 PT W/DXA NO RESULTS DOC: HCPCS | Performed by: INTERNAL MEDICINE

## 2024-06-03 PROCEDURE — 1090F PRES/ABSN URINE INCON ASSESS: CPT | Performed by: INTERNAL MEDICINE

## 2024-06-03 PROCEDURE — 1036F TOBACCO NON-USER: CPT | Performed by: INTERNAL MEDICINE

## 2024-06-03 PROCEDURE — 99214 OFFICE O/P EST MOD 30 MIN: CPT | Performed by: INTERNAL MEDICINE

## 2024-06-03 PROCEDURE — 3017F COLORECTAL CA SCREEN DOC REV: CPT | Performed by: INTERNAL MEDICINE

## 2024-06-03 RX ORDER — FAMOTIDINE 20 MG/1
20 TABLET, FILM COATED ORAL 2 TIMES DAILY
Qty: 60 TABLET | Refills: 3 | COMMUNITY
Start: 2024-06-03

## 2024-06-03 SDOH — ECONOMIC STABILITY: HOUSING INSECURITY
IN THE LAST 12 MONTHS, WAS THERE A TIME WHEN YOU DID NOT HAVE A STEADY PLACE TO SLEEP OR SLEPT IN A SHELTER (INCLUDING NOW)?: NO

## 2024-06-03 SDOH — ECONOMIC STABILITY: FOOD INSECURITY: WITHIN THE PAST 12 MONTHS, YOU WORRIED THAT YOUR FOOD WOULD RUN OUT BEFORE YOU GOT MONEY TO BUY MORE.: NEVER TRUE

## 2024-06-03 SDOH — ECONOMIC STABILITY: INCOME INSECURITY: HOW HARD IS IT FOR YOU TO PAY FOR THE VERY BASICS LIKE FOOD, HOUSING, MEDICAL CARE, AND HEATING?: NOT HARD AT ALL

## 2024-06-03 SDOH — ECONOMIC STABILITY: FOOD INSECURITY: WITHIN THE PAST 12 MONTHS, THE FOOD YOU BOUGHT JUST DIDN'T LAST AND YOU DIDN'T HAVE MONEY TO GET MORE.: NEVER TRUE

## 2024-06-03 ASSESSMENT — PATIENT HEALTH QUESTIONNAIRE - PHQ9
SUM OF ALL RESPONSES TO PHQ QUESTIONS 1-9: 0
2. FEELING DOWN, DEPRESSED OR HOPELESS: NOT AT ALL
SUM OF ALL RESPONSES TO PHQ QUESTIONS 1-9: 0
1. LITTLE INTEREST OR PLEASURE IN DOING THINGS: NOT AT ALL
SUM OF ALL RESPONSES TO PHQ QUESTIONS 1-9: 0
SUM OF ALL RESPONSES TO PHQ9 QUESTIONS 1 & 2: 0
SUM OF ALL RESPONSES TO PHQ QUESTIONS 1-9: 0

## 2024-06-03 NOTE — PATIENT INSTRUCTIONS
Phone:  By calling your area Job and Family Services:   [email or web address]    Online:  Apply at Health Insurance Marketplace:     Mail or drop off a paper application to your local Department of .  Applications can be downloaded and printed here: https://[email or web address].[place].gov/   Mailing may take longer than other methods of applying.   Find your nearest local Department of  by visiting [email or web address]     Please contact your local job and family services or check online to get updates on the status of your application. Your local DJ will not contact you with updates.          [place] Health Insurance Information Program (OSHIIP)   What they offer: The department's [place] Health Insurance Information Program (OSHIIP) provides Medicare beneficiaries with free, objective health insurance information, one-on-one counseling, and educates consumers about Medicare, Medicare prescription drug coverage (Part D), Medicare Advantage options, Medicare supplement insurance.  Website:  [email or web address]://[email or web address]-[email or web address]/[email or web address]-location/[email or web address]   Phone Number: 1-367.906.5931    [place] Department of Insurance  What they offer: Provide consumer protection through education and fair but vigilant regulation while promoting a stable and competitive environment for insurers  Website:  https://insurance.[place].gov/   Phone Number: 996.106.8328    Contact your Area Agency on Aging for information regarding waiver services and Medicaid based assistance for seniors and those with complex medical conditions.      Area Agencies on Aging (AAA)  [place] on Aging:   Counties Served: [place], [place], [place]  Address: [address], [facility], [address] / Phone: [phone]  Supports Offered  [facility]  Elderly Services Program (ALEX)  [facility]   Specialized Recovery Services   Assisted

## 2024-06-04 ASSESSMENT — ENCOUNTER SYMPTOMS
TROUBLE SWALLOWING: 0
CONSTIPATION: 0
HAIR LOSS: 0
EYE PAIN: 0
VISUAL CHANGE: 0
VOMITING: 0
ALLERGIC/IMMUNOLOGIC NEGATIVE: 1
HOARSE VOICE: 0
COUGH: 0
WHEEZING: 0
EYES NEGATIVE: 1
RHINORRHEA: 0
DIARRHEA: 0
SORE THROAT: 0
SHORTNESS OF BREATH: 0
GASTROINTESTINAL NEGATIVE: 1

## 2024-06-04 NOTE — PROGRESS NOTES
Natasha Canales (:  1950) is a 73 y.o. female,Established patient, here for evaluation of the following chief complaint(s):  Tinnitus      Assessment & Plan   ASSESSMENT/PLAN:  1. Acquired hypothyroidism, a month ago due to elevated TSH levothyroxine was increased from 75-88 mcg daily.  Patient does report more energy.  She is not sure if this is attributable to the change of inhaler for asthma or due to thyroid medication.  Will get a follow-up TSH level.  Clinically euthyroid.  -     TSH with Reflex to FT4; Future  2. Hypercholesterolemia, statin intolerance taking Zetia 10 mg daily and on diet.  Check follow-up lipid profile.  No symptoms of CAD, PAD or TIA.  -     Lipid Panel; Future  3. Stage 3a chronic kidney disease (HCC), PPI discontinued because each time PPI is given patient's renal function decreases.  Will get follow-up renal function since off of PPI for a month.  -     Renal Function Panel; Future  4. Ulcer of esophagus with bleeding  -     famotidine (PEPCID) 20 MG tablet; Take 1 tablet by mouth 2 times daily, Disp-60 tablet, R-3OTC  5. Screening cholesterol level: Same as problem #2  -     Lipid Panel; Future  6. Diabetes mellitus screening, A1c ordered  -     Hemoglobin A1C; Future      Return in about 3 months (around 9/3/2024).         Subjective   SUBJECTIVE/OBJECTIVE:  Thyroid Problem  Presents for follow-up visit. Patient reports no anxiety, cold intolerance, constipation, depressed mood, diaphoresis, diarrhea, dry skin, fatigue, hair loss, heat intolerance, hoarse voice, leg swelling, menstrual problem, nail problem, palpitations, tremors, visual change, weight gain or weight loss. (Is you have acquired hypothyroid currently on 88 mcg of levothyroxine for the past 4 weeks.  Reports feeling better with more energy.) The symptoms have been improving. Her past medical history is significant for hyperlipidemia. There is no history of diabetes.   Hyperlipidemia  This is a chronic

## 2024-06-06 ENCOUNTER — OFFICE VISIT (OUTPATIENT)
Dept: SURGERY | Age: 74
End: 2024-06-06

## 2024-06-06 VITALS — DIASTOLIC BLOOD PRESSURE: 88 MMHG | SYSTOLIC BLOOD PRESSURE: 141 MMHG | HEART RATE: 86 BPM

## 2024-06-06 DIAGNOSIS — L08.9 INFECTED SEBACEOUS CYST: Primary | ICD-10-CM

## 2024-06-06 DIAGNOSIS — L72.3 INFECTED SEBACEOUS CYST: Primary | ICD-10-CM

## 2024-06-06 RX ORDER — LIDOCAINE HYDROCHLORIDE 20 MG/ML
10 INJECTION, SOLUTION INFILTRATION; PERINEURAL ONCE
Status: COMPLETED | OUTPATIENT
Start: 2024-06-06 | End: 2024-06-06

## 2024-06-06 RX ADMIN — LIDOCAINE HYDROCHLORIDE 10 ML: 20 INJECTION, SOLUTION INFILTRATION; PERINEURAL at 14:03

## 2024-06-06 NOTE — PATIENT INSTRUCTIONS
Numbing medication will wear off in 1-2 hours.   OK to take tylenol and/or Ibuprofen for pain control.   OK to shower overtop of water tight glue. Do not scrub incision.   Warm soapy water over the top is fine.   Avoid submerging for 2-3 weeks until fully healed. (Bathtub, swimming pool or hot tub)   The sutures are dissolvable. There is nothing to remove, they absorb over time.   The surgical glue will fall off on its own after a week or two.   No restrictions, activities as tolerated.    Call for any increasing redness, worsening pain, or fevers greater than 101.5    If questions or concerns, please call Julia @ [phone].    Follow up as needed.     
13-Jun-2018 18:16

## 2024-06-06 NOTE — PROGRESS NOTES
Not on file     Lack of Transportation (Non-Medical): No   Physical Activity: Inactive (6/6/2023)    Exercise Vital Sign     Days of Exercise per Week: 0 days     Minutes of Exercise per Session: 0 min   Stress: Not on file   Social Connections: Not on file   Intimate Partner Violence: Not on file   Housing Stability: Unknown (6/3/2024)    Housing Stability Vital Sign     Unable to Pay for Housing in the Last Year: Not on file     Number of Places Lived in the Last Year: Not on file     Unstable Housing in the Last Year: No          Vitals:    06/06/24 1202   BP: (!) 141/88   Pulse: 86     There is no height or weight on file to calculate BMI.     Wt Readings from Last 3 Encounters:   06/03/24 69.3 kg (152 lb 12.8 oz)   04/02/24 70.4 kg (155 lb 3.2 oz)   01/17/24 68.9 kg (151 lb 12.8 oz)     BP Readings from Last 3 Encounters:   06/06/24 (!) 141/88   06/03/24 132/78   04/02/24 122/84          REVIEW OF SYSTEMS:   Pertinent positives are in HPI, otherwise all systems reviewed and negative    PHYSICAL EXAM:    CONSTITUTIONAL:  awake, alert, no apparent distress and normal weight  ENT:  normocephalic, without obvious abnormality  NECK:  supple, symmetrical, trachea midline   LUNGS:  Resp easy and unlabored  CARDIOVASCULAR:  regular rate and rhythm  MUSCULOSKELETAL: No edema  NEUROLOGIC:  Mental Status Exam:  Level of Alertness:   awake  Orientation:   person, place, time  SKIN: anterior chest with approximate 1 x 1 cm round cutaneous lesion, eschar no longer present, no erythema or active drainage        ASSESSMENT:     Diagnosis Orders   1. Infected sebaceous cyst  lidocaine 2 % injection 10 mL    52801 - OR EXC SKIN BENIG 1.1-2CM TRUNK,ARM,LEG            PLAN:    Ms Canales presents for excision of her previously infected sebaceous cyst of the anterior chest wall.    After informed consent was obtained, the anterior chest was prepped and draped.  2% lidocaine was injected around the cyst. A 15 blade scalpel was

## 2024-07-15 ENCOUNTER — PATIENT MESSAGE (OUTPATIENT)
Dept: PRIMARY CARE CLINIC | Age: 74
End: 2024-07-15

## 2024-07-15 DIAGNOSIS — F51.01 PRIMARY INSOMNIA: ICD-10-CM

## 2024-07-15 NOTE — TELEPHONE ENCOUNTER
Medication:   Requested Prescriptions     Pending Prescriptions Disp Refills    hydrOXYzine pamoate (VISTARIL) 25 MG capsule 120 capsule 4     Sig: Take 1 capsule by mouth 4 times daily as needed for Itching TAKE ONE CAPSULE BY MOUTH FOUR TIMES A DAY        Last Filled:      Patient Phone Number: [phone] (home)     Last appt: 6/3/2024   Next appt: 9/3/2024    Last OARRS:        No data to display

## 2024-07-15 NOTE — TELEPHONE ENCOUNTER
From: Natasha Canales  To: Dr. Jael Ashraf  Sent: 7/15/2024 1:10 PM EDT  Subject: refill hydroxyine    please send refill request to [facility] for this medication. I didn't see it on my list. Thank you

## 2024-07-16 RX ORDER — HYDROXYZINE PAMOATE 25 MG/1
25 CAPSULE ORAL 4 TIMES DAILY PRN
Qty: 120 CAPSULE | Refills: 4 | Status: SHIPPED | OUTPATIENT
Start: 2024-07-16

## 2024-07-25 ENCOUNTER — TELEPHONE (OUTPATIENT)
Dept: PRIMARY CARE CLINIC | Age: 74
End: 2024-07-25

## 2024-07-31 ENCOUNTER — OFFICE VISIT (OUTPATIENT)
Dept: PRIMARY CARE CLINIC | Age: 74
End: 2024-07-31

## 2024-07-31 VITALS
HEART RATE: 83 BPM | TEMPERATURE: 96.8 F | DIASTOLIC BLOOD PRESSURE: 80 MMHG | OXYGEN SATURATION: 96 % | SYSTOLIC BLOOD PRESSURE: 132 MMHG | HEIGHT: 61 IN | BODY MASS INDEX: 28.21 KG/M2 | WEIGHT: 149.4 LBS

## 2024-07-31 DIAGNOSIS — H93.13 TINNITUS OF BOTH EARS: ICD-10-CM

## 2024-07-31 DIAGNOSIS — N18.32 STAGE 3B CHRONIC KIDNEY DISEASE (HCC): ICD-10-CM

## 2024-07-31 DIAGNOSIS — Z00.00 MEDICARE ANNUAL WELLNESS VISIT, SUBSEQUENT: Primary | ICD-10-CM

## 2024-07-31 ASSESSMENT — PATIENT HEALTH QUESTIONNAIRE - PHQ9
SUM OF ALL RESPONSES TO PHQ QUESTIONS 1-9: 0
1. LITTLE INTEREST OR PLEASURE IN DOING THINGS: NOT AT ALL
SUM OF ALL RESPONSES TO PHQ QUESTIONS 1-9: 0
SUM OF ALL RESPONSES TO PHQ9 QUESTIONS 1 & 2: 0
2. FEELING DOWN, DEPRESSED OR HOPELESS: NOT AT ALL

## 2024-07-31 ASSESSMENT — LIFESTYLE VARIABLES
HOW OFTEN DO YOU HAVE A DRINK CONTAINING ALCOHOL: 2-3 TIMES A WEEK
HOW MANY STANDARD DRINKS CONTAINING ALCOHOL DO YOU HAVE ON A TYPICAL DAY: 1 OR 2

## 2024-07-31 NOTE — PROGRESS NOTES
Medicare Annual Wellness Visit    Natasha Canales is here for Medicare AWV (Discuss test ) and Results    Assessment & Plan   Medicare annual wellness visit, subsequent completed.  Tinnitus of both ears patient is undergoing acupuncture that is helping her symptoms.  Will make sure her audiology exam is normal.  -     [facility]  Stage 3b chronic kidney disease (HCC) PPI was discontinued but GFR remains at 45.  Patient is currently being followed by nephrology.  Has a follow-up appointment.  No edema or shortness of breath.      Latest Ref Rng & Units 2/29/2024     3:34 PM 1/17/2024     2:42 PM 11/22/2023     1:21 PM 10/25/2023     2:43 PM 6/6/2023     1:27 PM   Labs Renal   BUN 7 - 20 mg/dL 21  20  26  22  19    Cr 0.6 - 1.2 mg/dL 1.2  1.0  1.1  1.4  1.1    K 3.5 - 5.1 mmol/L 3.7  4.4  5.0  4.3  4.4    Na 136 - 145 mmol/L 139  137  141  144  140        [facility]  Outside Information      suggestion  Information displayed in this report may not trend or trigger automated decision support.      Contains abnormal data RENAL FUNCTION PANEL  Order: [id number]  Component  Ref Range & Units 7/18/24 1454 Resulting Agency   Sodium  136 - 145 mmol/L 139 [facility]   Potassium  3.5 - 5.0 mmol/L 3.6 [facility]   Chloride  98 - 107 mmol/L 101 [facility]   Total CO2  22 - 29 mmol/L 28 [facility]   Anion Gap  7 - 16 mmol/L 10 [facility]   Calcium  8.8 - 10.4 mg/dL 9.6 [facility]   Glucose Lvl  70 - 99 mg/dL 83 [facility]   BUN  8 - 23 mg/dL 17 [facility]   Creatinine  0.51 - 1.30 mg/dL 1.25 [facility]   Albumin  3.2 - 4.6 gm/dL 4.5 [facility]   Phosphorus  2.5 - 4.5 mg/dL 3.5 [facility]   eGFR (CKD-EPIcr 2021)  >=60 mL/min/1.73 m2 45 [facility]   Comment: Estimated GFR was calculated using the

## 2024-07-31 NOTE — PATIENT INSTRUCTIONS
150 minutes of exercise per week or 10,000 steps per day on a pedometer .  Order or download the FREE \"Exercise & Physical Activity: Your Everyday Guide\" from [employer] [place] [employer]. Call 1-514.409.9535 or search [employer] [place] [employer] online.  You need 2189-2282 mg of calcium and 9060-2182 IU of vitamin D per day. It is possible to meet your calcium requirement with diet alone, but a vitamin D supplement is usually necessary to meet this goal.  When exposed to the sun, use a sunscreen that protects against both UVA and UVB radiation with an SPF of 30 or greater. Reapply every 2 to 3 hours or after sweating, drying off with a towel, or swimming.  Always wear a seat belt when traveling in a car. Always wear a helmet when riding a bicycle or motorcycle.

## 2024-08-28 ENCOUNTER — PROCEDURE VISIT (OUTPATIENT)
Dept: AUDIOLOGY | Age: 74
End: 2024-08-28
Payer: MEDICARE

## 2024-08-28 ENCOUNTER — OFFICE VISIT (OUTPATIENT)
Dept: ENT CLINIC | Age: 74
End: 2024-08-28
Payer: MEDICARE

## 2024-08-28 VITALS
HEART RATE: 72 BPM | DIASTOLIC BLOOD PRESSURE: 84 MMHG | HEIGHT: 61 IN | WEIGHT: 152 LBS | BODY MASS INDEX: 28.7 KG/M2 | TEMPERATURE: 97.9 F | OXYGEN SATURATION: 95 % | SYSTOLIC BLOOD PRESSURE: 153 MMHG

## 2024-08-28 DIAGNOSIS — H93.19 TINNITUS, UNSPECIFIED LATERALITY: ICD-10-CM

## 2024-08-28 DIAGNOSIS — H93.13 TINNITUS OF BOTH EARS: ICD-10-CM

## 2024-08-28 DIAGNOSIS — H90.3 ASYMMETRIC SNHL (SENSORINEURAL HEARING LOSS): ICD-10-CM

## 2024-08-28 DIAGNOSIS — H90.3 SENSORINEURAL HEARING LOSS (SNHL) OF BOTH EARS: Primary | ICD-10-CM

## 2024-08-28 PROCEDURE — 1036F TOBACCO NON-USER: CPT | Performed by: OTOLARYNGOLOGY

## 2024-08-28 PROCEDURE — G8417 CALC BMI ABV UP PARAM F/U: HCPCS | Performed by: OTOLARYNGOLOGY

## 2024-08-28 PROCEDURE — 92557 COMPREHENSIVE HEARING TEST: CPT

## 2024-08-28 PROCEDURE — G8400 PT W/DXA NO RESULTS DOC: HCPCS | Performed by: OTOLARYNGOLOGY

## 2024-08-28 PROCEDURE — 92567 TYMPANOMETRY: CPT

## 2024-08-28 PROCEDURE — 1123F ACP DISCUSS/DSCN MKR DOCD: CPT | Performed by: OTOLARYNGOLOGY

## 2024-08-28 PROCEDURE — 99203 OFFICE O/P NEW LOW 30 MIN: CPT | Performed by: OTOLARYNGOLOGY

## 2024-08-28 PROCEDURE — 3017F COLORECTAL CA SCREEN DOC REV: CPT | Performed by: OTOLARYNGOLOGY

## 2024-08-28 PROCEDURE — 1090F PRES/ABSN URINE INCON ASSESS: CPT | Performed by: OTOLARYNGOLOGY

## 2024-08-28 PROCEDURE — G8427 DOCREV CUR MEDS BY ELIG CLIN: HCPCS | Performed by: OTOLARYNGOLOGY

## 2024-08-28 NOTE — PROGRESS NOTES
Natasha Canales   1950, 73 y.o. female   [id number]       Referring Provider: Jael Ashraf MD   Referral Type: In an order in Epic    Reason for Visit: Evaluation of suspected change in hearing, tinnitus, or balance.    ADULT AUDIOLOGIC EVALUATION      Natasha Canales is a 73 y.o. female seen today, 8/28/2024 , for an initial audiologic evaluation.  Patient was seen by Amrik Anderson MD following today's evaluation.    AUDIOLOGIC AND OTHER PERTINENT MEDICAL HISTORY:      Natasha Canales reported that she experiences occasional tinnitus, more in the right ear that can be bothersome. Patient stated that it has been occurring roughly since she had Covid in November of 2023. She denied any concerns for her hearing, except for occasional trouble in loud background noise. She stated that her hearing is symmetrical between the ears.     She denied otalgia, aural fullness, otorrhea, dizziness, imbalance, history of falls, history of noise exposure, history of head trauma, history of ear surgery, and family history of hearing loss    Date: 8/28/2024     IMPRESSIONS:      Today's results revealed Normal hearing sensitivity through 6000 Hz sloping to a Mild Sensorineural hearing loss at 8000 Hz in the right ear and Normal hearing sensitivity through 1500 Hz sloping to a Mild to Moderate Sensorineural hearing loss 6027-8710 Hz in the left ear. Excellent speech understanding when in quiet. Tympanometry indicates normal middle ear function. Discussed test results and implications with patient. Discussed possible benefits of amplification.  Discussed use of tinnitus management strategies. Provided basic tinnitus education, including the neurophysiological model.    Follow medical recommendations of Amrik Anderson MD.     ASSESSMENT AND FINDINGS:     Otoscopy unremarkable.    RIGHT EAR:  Hearing Sensitivity: Normal hearing sensitivity through 6000 Hz sloping to a Mild Sensorineural hearing loss at  8000 Hz  Speech Recognition Threshold: 5 dB HL  Word Recognition: Excellent 100%, based on NU-6 by-difficulty list at 50 dBHL using recorded speech stimuli.    Tympanometry: Normal peak pressure with high compliance, Type Ad tympanogram, consistent with hypermobile tympanic membrane.       LEFT EAR:  Hearing Sensitivity: Normal hearing sensitivity through 1500 Hz sloping to a Mild to Moderate Sensorineural hearing loss 9904-1302 Hz  Speech Recognition Threshold: 5 dB HL  Word Recognition: Excellent 100%, based on NU-6 by-difficulty list at 50 dBHL using recorded speech stimuli.    Tympanometry: Normal peak pressure with high compliance, Type Ad tympanogram, consistent with hypermobile tympanic membrane.       Reliability: Good   Transducer: HF Headphones    See scanned audiogram dated 8/28/2024 for results.        PATIENT EDUCATION:       The following items were discussed with the patient:   - Good Communication Strategies  - Hearing Loss and Hearing Aids  - Tinnitus Management Strategies      Educational information was shared in the After Visit Summary.                                                RECOMMENDATIONS:                                                                                                                                                                                                                                                            The following items are recommended based on patient report and results from today's appointment:   - Continue medical follow-up with Amrik Anderson MD.   - Retest hearing as medically indicated and/or sooner if a change in hearing is noted.  - If desired, schedule a Hearing Aid Evaluation (HAE) appointment to discuss hearing aid options.  - Utilize \"Good Communication Strategies\" as discussed to assist in speech understanding with communication partners.  - Maintain a sound enriched environment to assist in the management of tinnitus symptoms.    [place]

## 2024-08-28 NOTE — PATIENT INSTRUCTIONS
Hearing Loss: Care Instructions  Your Care Instructions      Hearing loss is a sudden or slow decrease in how well you hear. It can range from mild to profound. Permanent hearing loss can occur with aging, and it can happen when you are exposed long-term to loud noise. Examples include listening to loud music, riding motorcycles, or being around other loud machines.    Hearing loss can affect your work and home life. It can make you feel lonely or depressed. You may feel that you have lost your independence. But hearing aids and other devices can help you hear better and feel connected to others.    Follow-up care is a key part of your treatment and safety. Be sure to make and go to all appointments, and call your doctor if you are having problems. It's also a good idea to know your test results and keep a list of the medicines you take.    How can you care for yourself at home?  Avoid loud noises whenever possible. This helps keep your hearing from getting worse.  Always wear hearing protection around loud noises.  If appropriate, wear hearing aid(s) as directed.  It is recommended that hearing aids are worn during all waking hours to keep your brain active and give it access to the sounds it is missing.      If you are beginning your process with hearing aid(s), schedule a \"Hearing Aid Evaluation\" with an audiologist to discuss your lifestyle, features of hearing aid technology, and styles of hearing aids available.  It is recommended that you contact your insurance company to determine if you have a hearing aid benefit, as this may dictate who you can see for these services.  Have hearing tests as your doctor suggests. They can show whether your hearing has changed. Your hearing aid may need to be adjusted.  Use other assistive devices as needed. These may include:  Telephone amplifiers and hearing aids that can connect to a television, stereo, radio, or microphone.  Devices that use lights or vibrations. These  severe headache that is different from past headaches.    Call your doctor now or seek immediate medical care if:    You develop other symptoms. These may include hearing loss (or worse hearing loss), balance problems, dizziness, nausea, or vomiting.    Watch closely for changes in your health, and be sure to contact your doctor if:    Your tinnitus moves from both ears to one ear.     Your hearing loss gets worse within 1 day after an ear injury.     Your tinnitus or hearing loss does not get better within 1 week after an ear injury.     Your tinnitus bothers you enough that you want to take medicines to help you cope with it.      If you notice changes in your tinnitus and/or your hearing, it is recommended that you have your hearing tested by your audiologist and to follow-up with your physician that manages your hearing loss (such as your ENT or Primary Care doctor).

## 2024-08-28 NOTE — PROGRESS NOTES
[facility]  DIVISION OF OTOLARYNGOLOGY- HEAD & NECK SURGERY  CONSULT      Patient Name: Natasha Canales  Medical Record Number:  [id number]  Primary Care Physician:  Jael Ashraf MD  Date of Consultation: 8/28/2024    Chief Complaint: Ringing in the ears        HISTORY OF PRESENT ILLNESS  Natasha is a(n) 73 y.o. female who presents for evaluation of ringing in the ears.  She thinks this started several months ago.  She thought maybe it got worse around the time she got COVID.  Sometimes is difficult for her to tell which ear is ringing.  It is usually high-frequency.  It is worse when it is quiet.  She does not have significant other ear symptoms.  She has never had ear surgery            REVIEW OF SYSTEMS  As above #    PHYSICAL EXAM  GENERAL: No Acute Distress, Alert and Oriented, no Hoarseness, strong voice  EYES: EOMI, Anti-icteric  HENT:   Head: Normocephalic and atraumatic.   Face:  Symmetric, facial nerve intact, no sinus tenderness  Right Ear: Normal external ear, normal external auditory canal, intact tympanic membrane with normal mobility and aerated middle ear  Left Ear: Normal external ear, normal external auditory canal, intact tympanic membrane with normal mobility and aerated middle ear  Mouth/Oral Cavity:  normal lips, Uvula is midline, no mucosal lesions, no trismus  Oropharynx/Larynx:  normal oropharynx,  Nose:Normal external nasal appearance.   NECK: Normal range of motion, no thyromegaly, trachea is midline, no lymphadenopathy, no neck masses, no crepitus        Patient room today shows normal sloping to mild sensorineural hearing loss with type a tympanograms        ASSESSMENT/PLAN  1. Sensorineural hearing loss (SNHL) of both ears  Secondary to presbycusis.  She does not feel as though her hearing is bad enough that she would benefit from hearing aids.  I would recommend she follow-up in a year with another audiogram.  2. Tinnitus, unspecified laterality  Patient has

## 2024-08-30 DIAGNOSIS — F51.01 PRIMARY INSOMNIA: ICD-10-CM

## 2024-08-30 RX ORDER — HYDROXYZINE PAMOATE 25 MG/1
25 CAPSULE ORAL 4 TIMES DAILY PRN
Qty: 120 CAPSULE | Refills: 0 | Status: SHIPPED | OUTPATIENT
Start: 2024-08-30

## 2024-10-07 DIAGNOSIS — E03.9 ACQUIRED HYPOTHYROIDISM: ICD-10-CM

## 2024-10-07 DIAGNOSIS — E78.00 HYPERCHOLESTEROLEMIA: ICD-10-CM

## 2024-10-07 DIAGNOSIS — K22.11 ULCER OF ESOPHAGUS WITH BLEEDING: ICD-10-CM

## 2024-10-07 RX ORDER — FAMOTIDINE 20 MG/1
20 TABLET, FILM COATED ORAL 2 TIMES DAILY
Qty: 60 TABLET | Refills: 12 | Status: SHIPPED | OUTPATIENT
Start: 2024-10-07

## 2024-10-07 RX ORDER — EZETIMIBE 10 MG/1
10 TABLET ORAL DAILY
Qty: 90 TABLET | Refills: 3 | Status: SHIPPED | OUTPATIENT
Start: 2024-10-07

## 2024-10-07 NOTE — TELEPHONE ENCOUNTER
Medication:   Requested Prescriptions     Pending Prescriptions Disp Refills    ezetimibe (ZETIA) 10 MG tablet 90 tablet 3     Sig: Take 1 tablet by mouth daily        Last Filled:      Patient Phone Number: [phone] (home)     Last appt: 7/31/2024   Next appt: 10/7/2024    Last OARRS:        No data to display

## 2024-10-07 NOTE — TELEPHONE ENCOUNTER
Medication:   Requested Prescriptions     Pending Prescriptions Disp Refills    famotidine (PEPCID) 20 MG tablet 60 tablet 3     Sig: Take 1 tablet by mouth 2 times daily        Last Filled:      Patient Phone Number: [phone] (home)     Last appt: 7/31/2024   Next appt: 11/4/2024    Last OARRS:        No data to display

## 2024-10-08 NOTE — TELEPHONE ENCOUNTER
Medication:   Requested Prescriptions     Pending Prescriptions Disp Refills    levothyroxine (SYNTHROID) 88 MCG tablet 90 tablet 0     Sig: Take 1 tablet by mouth daily        Last Filled:      Patient Phone Number: [phone] (home)     Last appt: 7/31/2024   Next appt: 10/7/2024    Last OARRS:        No data to display

## 2024-10-10 RX ORDER — LEVOTHYROXINE SODIUM 88 UG/1
88 TABLET ORAL DAILY
Qty: 90 TABLET | Refills: 0 | Status: SHIPPED | OUTPATIENT
Start: 2024-10-10

## 2024-11-04 ENCOUNTER — OFFICE VISIT (OUTPATIENT)
Dept: PRIMARY CARE CLINIC | Age: 74
End: 2024-11-04

## 2024-11-04 VITALS
BODY MASS INDEX: 29.45 KG/M2 | HEIGHT: 61 IN | OXYGEN SATURATION: 98 % | SYSTOLIC BLOOD PRESSURE: 132 MMHG | DIASTOLIC BLOOD PRESSURE: 86 MMHG | TEMPERATURE: 97 F | WEIGHT: 156 LBS | RESPIRATION RATE: 16 BRPM | HEART RATE: 80 BPM

## 2024-11-04 DIAGNOSIS — N18.31 STAGE 3A CHRONIC KIDNEY DISEASE (HCC): ICD-10-CM

## 2024-11-04 DIAGNOSIS — Z23 FLU VACCINE NEED: ICD-10-CM

## 2024-11-04 DIAGNOSIS — E03.9 ACQUIRED HYPOTHYROIDISM: ICD-10-CM

## 2024-11-04 DIAGNOSIS — E78.00 HYPERCHOLESTEROLEMIA: ICD-10-CM

## 2024-11-04 DIAGNOSIS — J45.41 MODERATE PERSISTENT ASTHMA WITH ACUTE EXACERBATION: Primary | ICD-10-CM

## 2024-11-04 RX ORDER — FLUTICASONE PROPIONATE AND SALMETEROL 250; 50 UG/1; UG/1
1 POWDER RESPIRATORY (INHALATION) EVERY 12 HOURS
COMMUNITY
End: 2024-11-04

## 2024-11-04 RX ORDER — FLUTICASONE PROPIONATE AND SALMETEROL 500; 50 UG/1; UG/1
1 POWDER RESPIRATORY (INHALATION) EVERY 12 HOURS
Qty: 60 EACH | Refills: 3
Start: 2024-11-04

## 2024-11-04 RX ORDER — LEVOTHYROXINE SODIUM 88 UG/1
88 TABLET ORAL DAILY
Qty: 90 TABLET | Refills: 0
Start: 2024-11-04

## 2024-11-04 SDOH — ECONOMIC STABILITY: FOOD INSECURITY: WITHIN THE PAST 12 MONTHS, THE FOOD YOU BOUGHT JUST DIDN'T LAST AND YOU DIDN'T HAVE MONEY TO GET MORE.: NEVER TRUE

## 2024-11-04 SDOH — ECONOMIC STABILITY: FOOD INSECURITY: WITHIN THE PAST 12 MONTHS, YOU WORRIED THAT YOUR FOOD WOULD RUN OUT BEFORE YOU GOT MONEY TO BUY MORE.: NEVER TRUE

## 2024-11-04 SDOH — ECONOMIC STABILITY: INCOME INSECURITY: HOW HARD IS IT FOR YOU TO PAY FOR THE VERY BASICS LIKE FOOD, HOUSING, MEDICAL CARE, AND HEATING?: NOT HARD AT ALL

## 2024-11-04 ASSESSMENT — ENCOUNTER SYMPTOMS
ABDOMINAL PAIN: 0
WATER BRASH: 0
WHEEZING: 0
GLOBUS SENSATION: 0
STRIDOR: 0
COUGH: 0
HEARTBURN: 1
NAUSEA: 0
BELCHING: 0
CHOKING: 0

## 2024-11-04 ASSESSMENT — PATIENT HEALTH QUESTIONNAIRE - PHQ9
SUM OF ALL RESPONSES TO PHQ QUESTIONS 1-9: 2
SUM OF ALL RESPONSES TO PHQ QUESTIONS 1-9: 2
SUM OF ALL RESPONSES TO PHQ9 QUESTIONS 1 & 2: 2
1. LITTLE INTEREST OR PLEASURE IN DOING THINGS: SEVERAL DAYS
SUM OF ALL RESPONSES TO PHQ QUESTIONS 1-9: 2
2. FEELING DOWN, DEPRESSED OR HOPELESS: SEVERAL DAYS
SUM OF ALL RESPONSES TO PHQ QUESTIONS 1-9: 2

## 2024-11-04 NOTE — PROGRESS NOTES
Natasha Canales (:  1950) is a 73 y.o. female,Established patient, here for evaluation of the following chief complaint(s):  3 Month Follow-Up, Medication Check (Discuss supplement for brain- Sometimes can't think of words/Discuss omeprazole- acid reflux meds/Request handicap Letter and rX/), and Tinnitus (F/u /)      Assessment & Plan   ASSESSMENT/PLAN:  1. Moderate persistent asthma with acute exacerbation  Pulmonary doctor recently increased the strength of Advair from 2 50-50 the 500-51-week ago and patient has been stable.  -     fluticasone-salmeterol (ADVAIR DISKUS) 500-50 MCG/ACT AEPB diskus inhaler; Inhale 1 puff into the lungs in the morning and 1 puff in the evening., Disp-60 each, R-3NO PRINT  2. Acquired hypothyroidism clinically euthyroid on 88 mcg but unfortunately the 75 mcg was refilled and patient has a full 90-day supply.  Patient instructed to take 75 mcg daily except on  and Friday take 1-/2 75 mcg tablet and that we will approximate her close to taking 88 mcg daily.  That way she can use up her current supply.  Lab Results   Component Value Date    TSH 7.12 (H) 2019    TSHFT4 3.38 2023 - THYROID ([facility])?  Component 2024          TSH Reflex 2.330 -- 16.900 High     --   Free T4 -- -- -- 0.85   TSH         -     levothyroxine (SYNTHROID) 88 MCG tablet; Take 1 tablet by mouth daily, Disp-90 tablet, R-0NO PRINT  3. Stage 3a chronic kidney disease (HCC) patient is not able to completely discontinue her PPI.  She continues famotidine 20 mg twice a day and takes a PPI about once and sometimes twice a week but no more.  This controls the GERD symptoms.  Will continue to monitor renal profile and if GFR decrease we will add a ACE inhibitor.  -     Lipid Panel; Future  -     Renal Function Panel; Future  4. Hypercholesterolemia taking Zetia 10 mg daily will check lipid profile to see if

## 2024-11-04 NOTE — PATIENT INSTRUCTIONS
Get the Covid 19 annual booster and the one time RSV vaccine at [facility] pharmacy.  Will give flu vaccine.      Take levothyroxine 75 mcg daily except on Mondays , Wednesday and Fridays take one and 1/2 tablet daily.

## 2024-11-05 LAB
ALBUMIN SERPL-MCNC: 4.7 G/DL (ref 3.4–5)
ANION GAP SERPL CALCULATED.3IONS-SCNC: 12 MMOL/L (ref 3–16)
BUN SERPL-MCNC: 24 MG/DL (ref 7–20)
CALCIUM SERPL-MCNC: 9.9 MG/DL (ref 8.3–10.6)
CHLORIDE SERPL-SCNC: 101 MMOL/L (ref 99–110)
CHOLEST SERPL-MCNC: 301 MG/DL (ref 0–199)
CO2 SERPL-SCNC: 25 MMOL/L (ref 21–32)
CREAT SERPL-MCNC: 1.2 MG/DL (ref 0.6–1.2)
GFR SERPLBLD CREATININE-BSD FMLA CKD-EPI: 47 ML/MIN/{1.73_M2}
GLUCOSE SERPL-MCNC: 81 MG/DL (ref 70–99)
HDLC SERPL-MCNC: 65 MG/DL (ref 40–60)
LDLC SERPL CALC-MCNC: 191 MG/DL
PHOSPHATE SERPL-MCNC: 3.4 MG/DL (ref 2.5–4.9)
POTASSIUM SERPL-SCNC: 4.4 MMOL/L (ref 3.5–5.1)
SODIUM SERPL-SCNC: 138 MMOL/L (ref 136–145)
TRIGL SERPL-MCNC: 223 MG/DL (ref 0–150)
VLDLC SERPL CALC-MCNC: 45 MG/DL

## 2024-11-07 DIAGNOSIS — E78.00 HYPERCHOLESTEROLEMIA: Primary | ICD-10-CM

## 2024-11-07 RX ORDER — EVOLOCUMAB 140 MG/ML
140 INJECTION, SOLUTION SUBCUTANEOUS
Qty: 2.1 ML | Refills: 5 | Status: SHIPPED | OUTPATIENT
Start: 2024-11-07

## 2024-11-08 NOTE — RESULT ENCOUNTER NOTE
The 10-year ASCVD risk score (Batsheva LANDERS, et al., 2019) is: 16%    Values used to calculate the score:      Age: 73 years      Sex: Female      Is Non- : No      Diabetic: No      Tobacco smoker: No      Systolic Blood Pressure: 132 mmHg      Is BP treated: No      HDL Cholesterol: 65 mg/dL      Total Cholesterol: 301 mg/dL    The cholesterol is still very elevated.  You can finish the zetia, but do not refill it because it is not lowering the cholesterol to protect your heart.  I sent in Repatha which is a cholesterol lowering injection to take every 2 weeks and is very effective in lowering the cholesterol and cardiovascular risks.  If it is very expensive, do not pick it up and will apply for patient assistance from the pharmaceutical company.     The kidney function is stable but not improved.  Make a follow up appointment with nephrology .

## 2024-11-11 ENCOUNTER — TELEPHONE (OUTPATIENT)
Dept: PRIMARY CARE CLINIC | Age: 74
End: 2024-11-11

## 2024-11-11 DIAGNOSIS — E78.00 HYPERCHOLESTEROLEMIA: Primary | ICD-10-CM

## 2024-11-11 RX ORDER — ROSUVASTATIN CALCIUM 5 MG/1
5 TABLET, COATED ORAL DAILY
Qty: 30 TABLET | Refills: 5 | Status: SHIPPED | OUTPATIENT
Start: 2024-11-11

## 2024-11-11 NOTE — TELEPHONE ENCOUNTER
PT called in wanting to speak directly to Dr. Ashraf regarding her prescription for Repatha. PT would not go into further detail.     Please call PT back to discuss: [phone]

## 2024-11-11 NOTE — TELEPHONE ENCOUNTER
Patient called and she is willing to try statin again.  I reviewed her past statin history and she is actually on pravastatin 40 mg 2016 to 2019 2019.  It looks like it improved the LDL from 1 90-1 40 but then was switched to atorvastatin 40 in 2020 and that was stopped due to intolerance.  Will try the water-soluble rosuvastatin 5 mg daily and symptom [facility]

## 2024-11-12 ENCOUNTER — TELEPHONE (OUTPATIENT)
Dept: PRIMARY CARE CLINIC | Age: 74
End: 2024-11-12

## 2024-11-12 NOTE — TELEPHONE ENCOUNTER
She doesn't have part D medicare. Call [employer] to get her assistance for the RSV vaccine to be covered.

## 2024-11-12 NOTE — TELEPHONE ENCOUNTER
"[employer] states that they do not have a assistance program for the RSV vaccine. If pt had Medicare part D, then the vaccine would be covered at no cost to the patient. But since she does not, pt would need to pay what ever the asking price is from the pharmacy.

## 2024-11-13 ENCOUNTER — TELEPHONE (OUTPATIENT)
Dept: PRIMARY CARE CLINIC | Age: 74
End: 2024-11-13

## 2024-11-13 NOTE — TELEPHONE ENCOUNTER
PT called in stating that she needs Dr. Ashraf to complete a form in order for her to receive her RSV vaccine & she spoke with a representative at [employer] who adv that the form is on their website & can be printed off for Dr. Ashraf to complete (PT did not have this form). The # to call [employer] as well is [phone].    She also noted that she is due for her second shingles vaccine and she had gotten that here at the office (however we no longer provide the shingles vaccine so not sure how to proceed with PT getting her second one) & PT would like to make sure that it would be at no cost to her because she only has Part A medicare and not Part B.     Lastly, PT adv that she will send in a form for Dr. Ashraf to complete in order to get her handicap sticker/placard.     Please call PT back regarding the shingles vaccine: [phone]

## 2024-11-14 NOTE — TELEPHONE ENCOUNTER
Pt has been informed.    Pt states that she spoke with [facility] in regards to getting her Shingles vaccine. She states that she received the first one and was charged. So she spoke with and she helped her with the price.   Pt is requesting a call back from [facility]adira in regards to getting the second vaccine.    Please advise.

## 2024-11-22 ENCOUNTER — TELEPHONE (OUTPATIENT)
Dept: PRIMARY CARE CLINIC | Age: 74
End: 2024-11-22

## 2024-11-22 NOTE — TELEPHONE ENCOUNTER
Pt has been informed that her Disability License Plate form is ready for .  Pt request that form be mailed to her.  Pt address has been verified and placed in the mail.

## 2025-02-03 SDOH — ECONOMIC STABILITY: TRANSPORTATION INSECURITY
IN THE PAST 12 MONTHS, HAS THE LACK OF TRANSPORTATION KEPT YOU FROM MEDICAL APPOINTMENTS OR FROM GETTING MEDICATIONS?: NO

## 2025-02-03 SDOH — ECONOMIC STABILITY: TRANSPORTATION INSECURITY
IN THE PAST 12 MONTHS, HAS LACK OF TRANSPORTATION KEPT YOU FROM MEETINGS, WORK, OR FROM GETTING THINGS NEEDED FOR DAILY LIVING?: NO

## 2025-02-03 SDOH — ECONOMIC STABILITY: FOOD INSECURITY: WITHIN THE PAST 12 MONTHS, YOU WORRIED THAT YOUR FOOD WOULD RUN OUT BEFORE YOU GOT MONEY TO BUY MORE.: PATIENT DECLINED

## 2025-02-03 SDOH — ECONOMIC STABILITY: INCOME INSECURITY: IN THE LAST 12 MONTHS, WAS THERE A TIME WHEN YOU WERE NOT ABLE TO PAY THE MORTGAGE OR RENT ON TIME?: PATIENT DECLINED

## 2025-02-03 SDOH — ECONOMIC STABILITY: FOOD INSECURITY: WITHIN THE PAST 12 MONTHS, THE FOOD YOU BOUGHT JUST DIDN'T LAST AND YOU DIDN'T HAVE MONEY TO GET MORE.: PATIENT DECLINED

## 2025-02-03 ASSESSMENT — PATIENT HEALTH QUESTIONNAIRE - PHQ9
SUM OF ALL RESPONSES TO PHQ QUESTIONS 1-9: 5
3. TROUBLE FALLING OR STAYING ASLEEP: SEVERAL DAYS
SUM OF ALL RESPONSES TO PHQ QUESTIONS 1-9: 5
7. TROUBLE CONCENTRATING ON THINGS, SUCH AS READING THE NEWSPAPER OR WATCHING TELEVISION: NOT AT ALL
10. IF YOU CHECKED OFF ANY PROBLEMS, HOW DIFFICULT HAVE THESE PROBLEMS MADE IT FOR YOU TO DO YOUR WORK, TAKE CARE OF THINGS AT HOME, OR GET ALONG WITH OTHER PEOPLE: NOT DIFFICULT AT ALL
7. TROUBLE CONCENTRATING ON THINGS, SUCH AS READING THE NEWSPAPER OR WATCHING TELEVISION: NOT AT ALL
8. MOVING OR SPEAKING SO SLOWLY THAT OTHER PEOPLE COULD HAVE NOTICED. OR THE OPPOSITE, BEING SO FIGETY OR RESTLESS THAT YOU HAVE BEEN MOVING AROUND A LOT MORE THAN USUAL: NOT AT ALL
6. FEELING BAD ABOUT YOURSELF - OR THAT YOU ARE A FAILURE OR HAVE LET YOURSELF OR YOUR FAMILY DOWN: NOT AT ALL
SUM OF ALL RESPONSES TO PHQ QUESTIONS 1-9: 5
5. POOR APPETITE OR OVEREATING: NOT AT ALL
4. FEELING TIRED OR HAVING LITTLE ENERGY: SEVERAL DAYS
4. FEELING TIRED OR HAVING LITTLE ENERGY: SEVERAL DAYS
2. FEELING DOWN, DEPRESSED OR HOPELESS: MORE THAN HALF THE DAYS
SUM OF ALL RESPONSES TO PHQ QUESTIONS 1-9: 5
3. TROUBLE FALLING OR STAYING ASLEEP: SEVERAL DAYS
SUM OF ALL RESPONSES TO PHQ9 QUESTIONS 1 & 2: 3
2. FEELING DOWN, DEPRESSED OR HOPELESS: MORE THAN HALF THE DAYS
9. THOUGHTS THAT YOU WOULD BE BETTER OFF DEAD, OR OF HURTING YOURSELF: NOT AT ALL
SUM OF ALL RESPONSES TO PHQ9 QUESTIONS 1 & 2: 3
SUM OF ALL RESPONSES TO PHQ QUESTIONS 1-9: 5
9. THOUGHTS THAT YOU WOULD BE BETTER OFF DEAD, OR OF HURTING YOURSELF: NOT AT ALL
8. MOVING OR SPEAKING SO SLOWLY THAT OTHER PEOPLE COULD HAVE NOTICED. OR THE OPPOSITE - BEING SO FIDGETY OR RESTLESS THAT YOU HAVE BEEN MOVING AROUND A LOT MORE THAN USUAL: NOT AT ALL
1. LITTLE INTEREST OR PLEASURE IN DOING THINGS: SEVERAL DAYS
6. FEELING BAD ABOUT YOURSELF - OR THAT YOU ARE A FAILURE OR HAVE LET YOURSELF OR YOUR FAMILY DOWN: NOT AT ALL
1. LITTLE INTEREST OR PLEASURE IN DOING THINGS: SEVERAL DAYS
5. POOR APPETITE OR OVEREATING: NOT AT ALL
10. IF YOU CHECKED OFF ANY PROBLEMS, HOW DIFFICULT HAVE THESE PROBLEMS MADE IT FOR YOU TO DO YOUR WORK, TAKE CARE OF THINGS AT HOME, OR GET ALONG WITH OTHER PEOPLE: NOT DIFFICULT AT ALL

## 2025-02-04 ENCOUNTER — OFFICE VISIT (OUTPATIENT)
Dept: PRIMARY CARE CLINIC | Age: 75
End: 2025-02-04

## 2025-02-04 VITALS
RESPIRATION RATE: 16 BRPM | SYSTOLIC BLOOD PRESSURE: 162 MMHG | TEMPERATURE: 97.2 F | HEART RATE: 82 BPM | OXYGEN SATURATION: 95 % | HEIGHT: 61 IN | DIASTOLIC BLOOD PRESSURE: 100 MMHG | WEIGHT: 158 LBS | BODY MASS INDEX: 29.83 KG/M2

## 2025-02-04 DIAGNOSIS — E03.9 ACQUIRED HYPOTHYROIDISM: ICD-10-CM

## 2025-02-04 DIAGNOSIS — N18.31 STAGE 3A CHRONIC KIDNEY DISEASE (HCC): ICD-10-CM

## 2025-02-04 DIAGNOSIS — E78.00 HYPERCHOLESTEROLEMIA: ICD-10-CM

## 2025-02-04 DIAGNOSIS — J45.41 MODERATE PERSISTENT ASTHMA WITH ACUTE EXACERBATION: ICD-10-CM

## 2025-02-04 DIAGNOSIS — R03.0 ELEVATED BLOOD PRESSURE READING: Primary | ICD-10-CM

## 2025-02-04 LAB
ALBUMIN SERPL-MCNC: 4.9 G/DL (ref 3.4–5)
ALBUMIN/GLOB SERPL: 1.6 {RATIO} (ref 1.1–2.2)
ALP SERPL-CCNC: 78 U/L (ref 40–129)
ALT SERPL-CCNC: 21 U/L (ref 10–40)
ANION GAP SERPL CALCULATED.3IONS-SCNC: 12 MMOL/L (ref 3–16)
AST SERPL-CCNC: 25 U/L (ref 15–37)
BILIRUB SERPL-MCNC: 0.3 MG/DL (ref 0–1)
BUN SERPL-MCNC: 22 MG/DL (ref 7–20)
CALCIUM SERPL-MCNC: 10.6 MG/DL (ref 8.3–10.6)
CHLORIDE SERPL-SCNC: 103 MMOL/L (ref 99–110)
CHOLEST SERPL-MCNC: 257 MG/DL (ref 0–199)
CO2 SERPL-SCNC: 26 MMOL/L (ref 21–32)
CREAT SERPL-MCNC: 1.2 MG/DL (ref 0.6–1.2)
CREAT UR-MCNC: 59.2 MG/DL (ref 28–259)
GFR SERPLBLD CREATININE-BSD FMLA CKD-EPI: 47 ML/MIN/{1.73_M2}
GLUCOSE SERPL-MCNC: 95 MG/DL (ref 70–99)
HDLC SERPL-MCNC: 75 MG/DL (ref 40–60)
LDLC SERPL CALC-MCNC: 145 MG/DL
MICROALBUMIN UR DL<=1MG/L-MCNC: 3.15 MG/DL
MICROALBUMIN/CREAT UR: 53.2 MG/G (ref 0–30)
POTASSIUM SERPL-SCNC: 4.5 MMOL/L (ref 3.5–5.1)
PROT SERPL-MCNC: 8 G/DL (ref 6.4–8.2)
SODIUM SERPL-SCNC: 141 MMOL/L (ref 136–145)
T4 FREE SERPL-MCNC: 1.1 NG/DL (ref 0.9–1.8)
TRIGL SERPL-MCNC: 186 MG/DL (ref 0–150)
TSH SERPL DL<=0.005 MIU/L-ACNC: 15 UIU/ML (ref 0.27–4.2)
VLDLC SERPL CALC-MCNC: 37 MG/DL

## 2025-02-04 RX ORDER — FLUTICASONE PROPIONATE AND SALMETEROL 500; 50 UG/1; UG/1
1 POWDER RESPIRATORY (INHALATION) EVERY 12 HOURS
Qty: 3 EACH | Refills: 3 | Status: SHIPPED | OUTPATIENT
Start: 2025-02-04

## 2025-02-04 RX ORDER — ROSUVASTATIN CALCIUM 5 MG/1
5 TABLET, COATED ORAL DAILY
Qty: 90 TABLET | Refills: 3 | Status: SHIPPED | OUTPATIENT
Start: 2025-02-04

## 2025-02-04 RX ORDER — LISINOPRIL 10 MG/1
10 TABLET ORAL DAILY
Qty: 90 TABLET | Refills: 1 | Status: SHIPPED | OUTPATIENT
Start: 2025-02-04

## 2025-02-06 DIAGNOSIS — E03.9 ACQUIRED HYPOTHYROIDISM: ICD-10-CM

## 2025-02-06 RX ORDER — LEVOTHYROXINE SODIUM 100 UG/1
100 TABLET ORAL DAILY
Qty: 90 TABLET | Refills: 0
Start: 2025-02-06

## 2025-02-06 NOTE — RESULT ENCOUNTER NOTE
The cholesterol improved with the bad LDL cholesterol decreasing from 1 91-1 45 and our goal is 100.  The thyroid medication is too low.  Low thyroid also raises the cholesterol level correcting the thyroid level also improved the cholesterol.  For this reason I will continue the same amount of cholesterol medication and will increase your thyroid medication.  You can use up the 88 mcg.  Take 2 of the 88 mcg tablets Monday thru Friday.  And one  88 mcg tablet on Saturday and none on Sunday. This will approximate a new script of 100 mcg daily. Will not send in the new script until you use up the old.  The weekly dose of thyroid will add up to 749 mcg as opossed to 700 mcg with the 100 mcg tablets.  Thyroid medication is difficult to absorb so make sure you take it first thing in the morning on an empty stomach with water only.  Wait an hour before eating drinking or taking any other medications.  To avoid this morning annoying ritual I offered to patient's to keep the thyroid medicine on their nightstand with a glass of water and when you get up to go to the bathroom in the middle of the night take the thyroid pill at that time as an alternative.  The kidney function is stable but shows you need to drink more water.  I gave you the blood pressure pill lisinopril but it also protects the kidney.  It will not make your blood pressure too low.  Initially we said you would monitor your blood pressure to see if you needed but I want you to take 1 daily to protect your kidneys.

## 2025-02-07 ASSESSMENT — ENCOUNTER SYMPTOMS
BLURRED VISION: 0
TROUBLE SWALLOWING: 0
COUGH: 0
CONSTIPATION: 0
ABDOMINAL PAIN: 0
ALLERGIC/IMMUNOLOGIC NEGATIVE: 1
CHOKING: 0
DIARRHEA: 0
RHINORRHEA: 0
EYE PAIN: 0
WHEEZING: 0
VOMITING: 0
NAUSEA: 0
SHORTNESS OF BREATH: 0
ORTHOPNEA: 0
EYES NEGATIVE: 1

## 2025-02-07 NOTE — PROGRESS NOTES
Natasha Canales (:  1950) is a 74 y.o. female,Established patient, here for evaluation of the following chief complaint(s):  3 Month Follow-Up (/) and Medication Refill ([facility] Fax: [phone])      Assessment & Plan   ASSESSMENT/PLAN:  1. Elevated blood pressure reading without history of hypertension.  Patient will prefer to check blood pressures regularly at home and start the medicine if blood pressure elevated.  With like blood pressures below 130/80 and with chronic kidney disease around 120/80.  With mildly elevated free albumin with labs will encourage patient to take lisinopril daily now.  Cautioned her to let me know if cough or lip swelling.  -     lisinopril (PRINIVIL;ZESTRIL) 10 MG tablet; Take 1 tablet by mouth daily, Disp-90 tablet, R-1Normal  2. Hypercholesterolemia insurance would not cover Nexletol and patient did not tolerate other statins will start with a water-soluble rosuvastatin 5 mg daily.  No symptoms of CAD but is at high risk so is important to start statin therapy.  Patient does follow the Mediterranean diet and encouraged daily exercise.  The 10-year ASCVD risk score (Batsheva DK, et al., 2019) is: 24.9%    Values used to calculate the score:      Age: 74 years      Sex: Female      Is Non- : No      Diabetic: No      Tobacco smoker: No      Systolic Blood Pressure: 162 mmHg      Is BP treated: No      HDL Cholesterol: 75 mg/dL      Total Cholesterol: 257 mg/dL    Lab Results   Component Value Date    CHOL 257 (H) 2025    CHOL 301 (H) 2024    CHOL 290 (H) 2023     Lab Results   Component Value Date    TRIG 186 (H) 2025    TRIG 223 (H) 2024    TRIG 192 (H) 2023     Lab Results   Component Value Date    HDL 75 (H) 2025    HDL 65 (H) 2024    HDL 72 (H) 2023     Lab Results   Component Value Date     (H) 2025     (H) 2024     (H) 2023     Lab

## 2025-03-17 DIAGNOSIS — N18.31 STAGE 3A CHRONIC KIDNEY DISEASE (HCC): Primary | ICD-10-CM

## 2025-03-17 RX ORDER — VALSARTAN 80 MG/1
80 TABLET ORAL DAILY
Qty: 30 TABLET | Refills: 5 | Status: SHIPPED | OUTPATIENT
Start: 2025-03-17

## 2025-04-02 DIAGNOSIS — N18.31 STAGE 3A CHRONIC KIDNEY DISEASE (HCC): ICD-10-CM

## 2025-04-02 DIAGNOSIS — F51.01 PRIMARY INSOMNIA: ICD-10-CM

## 2025-04-02 DIAGNOSIS — E03.9 ACQUIRED HYPOTHYROIDISM: ICD-10-CM

## 2025-04-02 NOTE — TELEPHONE ENCOUNTER
Patient calling needs refills on medications Valsartan Diovan 80 MG tablet and Levothyroxine synthroid 100 MCG and Hydroxyzine pamoate 25 MG capsules pls send to [facility] pharm @ [phone]/ [phone] any questions pls return patient call back @ [phone]

## 2025-04-04 NOTE — TELEPHONE ENCOUNTER
Medication:   Requested Prescriptions     Pending Prescriptions Disp Refills    valsartan (DIOVAN) 80 MG tablet 30 tablet 5     Sig: Take 1 tablet by mouth daily Discontinue lisinopril due to cough    levothyroxine (SYNTHROID) 100 MCG tablet 90 tablet 0     Sig: Take 1 tablet by mouth daily    hydrOXYzine pamoate (VISTARIL) 25 MG capsule 120 capsule 0     Sig: Take 1 capsule by mouth 4 times daily as needed for Itching TAKE ONE CAPSULE BY MOUTH FOUR TIMES A DAY       Last Filled:      Patient Phone Number: [phone] (home)     Last appt: 2/4/2025   Next appt: Visit date not found    Last Lipid:   Lab Results   Component Value Date/Time    CHOL 257 02/04/2025 02:18 PM    TRIG 186 02/04/2025 02:18 PM    HDL 75 02/04/2025 02:18 PM

## 2025-04-05 RX ORDER — LEVOTHYROXINE SODIUM 100 UG/1
100 TABLET ORAL DAILY
Qty: 90 TABLET | Refills: 0 | Status: SHIPPED | OUTPATIENT
Start: 2025-04-05

## 2025-04-05 RX ORDER — HYDROXYZINE PAMOATE 25 MG/1
25 CAPSULE ORAL 4 TIMES DAILY PRN
Qty: 120 CAPSULE | Refills: 0 | Status: SHIPPED | OUTPATIENT
Start: 2025-04-05

## 2025-04-05 RX ORDER — VALSARTAN 80 MG/1
80 TABLET ORAL DAILY
Qty: 30 TABLET | Refills: 5 | Status: SHIPPED | OUTPATIENT
Start: 2025-04-05

## 2025-04-14 DIAGNOSIS — K22.11 ULCER OF ESOPHAGUS WITH BLEEDING: ICD-10-CM

## 2025-04-14 NOTE — TELEPHONE ENCOUNTER
Medication:   Requested Prescriptions     Pending Prescriptions Disp Refills    famotidine (PEPCID) 20 MG tablet 60 tablet 12     Sig: Take 1 tablet by mouth 2 times daily        Last Filled:      Patient Phone Number: [phone] (home)     Last appt: 2/4/2025   Next appt: Visit date not found    Last OARRS:        No data to display

## 2025-04-15 RX ORDER — FAMOTIDINE 20 MG/1
20 TABLET, FILM COATED ORAL 2 TIMES DAILY
Qty: 60 TABLET | Refills: 12 | Status: SHIPPED | OUTPATIENT
Start: 2025-04-15

## 2025-04-17 ENCOUNTER — TRANSCRIBE ORDERS (OUTPATIENT)
Dept: ADMINISTRATIVE | Age: 75
End: 2025-04-17

## 2025-04-17 DIAGNOSIS — K86.2 PANCREATIC CYST: Primary | ICD-10-CM

## 2025-04-30 ENCOUNTER — HOSPITAL ENCOUNTER (OUTPATIENT)
Dept: MRI IMAGING | Age: 75
Discharge: HOME | End: 2025-04-30
Attending: INTERNAL MEDICINE
Payer: MEDICARE

## 2025-04-30 DIAGNOSIS — K86.2 PANCREATIC CYST: ICD-10-CM

## 2025-04-30 PROCEDURE — A9579 GAD-BASE MR CONTRAST NOS,1ML: HCPCS | Performed by: INTERNAL MEDICINE

## 2025-04-30 PROCEDURE — 6360000004 HC RX CONTRAST MEDICATION: Performed by: INTERNAL MEDICINE

## 2025-04-30 PROCEDURE — 74183 MRI ABD W/O CNTR FLWD CNTR: CPT

## 2025-04-30 RX ADMIN — GADOTERIDOL 14 ML: 279.3 INJECTION, SOLUTION INTRAVENOUS at 16:39

## 2025-07-03 ENCOUNTER — TELEPHONE (OUTPATIENT)
Dept: PRIMARY CARE CLINIC | Age: 75
End: 2025-07-03

## 2025-07-03 DIAGNOSIS — F51.01 PRIMARY INSOMNIA: ICD-10-CM

## 2025-07-03 DIAGNOSIS — E03.9 ACQUIRED HYPOTHYROIDISM: ICD-10-CM

## 2025-07-03 RX ORDER — LEVOTHYROXINE SODIUM 100 UG/1
100 TABLET ORAL DAILY
Qty: 90 TABLET | Refills: 0 | Status: SHIPPED | OUTPATIENT
Start: 2025-07-03 | End: 2025-07-03

## 2025-07-03 RX ORDER — HYDROXYZINE PAMOATE 25 MG/1
25 CAPSULE ORAL 4 TIMES DAILY PRN
Qty: 120 CAPSULE | Refills: 0 | Status: SHIPPED | OUTPATIENT
Start: 2025-07-03

## 2025-07-03 RX ORDER — LEVOTHYROXINE SODIUM 100 UG/1
100 TABLET ORAL DAILY
Qty: 90 TABLET | Refills: 0 | Status: SHIPPED | OUTPATIENT
Start: 2025-07-03

## 2025-07-03 NOTE — TELEPHONE ENCOUNTER
Please refill the levothyroxine - [facility]   Hydroxyzine please refill at the local [facility]

## 2025-07-14 ENCOUNTER — OFFICE VISIT (OUTPATIENT)
Dept: PRIMARY CARE CLINIC | Age: 75
End: 2025-07-14
Payer: MEDICARE

## 2025-07-14 VITALS
HEART RATE: 75 BPM | BODY MASS INDEX: 28.83 KG/M2 | WEIGHT: 152.6 LBS | RESPIRATION RATE: 18 BRPM | SYSTOLIC BLOOD PRESSURE: 126 MMHG | OXYGEN SATURATION: 99 % | DIASTOLIC BLOOD PRESSURE: 84 MMHG

## 2025-07-14 DIAGNOSIS — N18.31 STAGE 3A CHRONIC KIDNEY DISEASE (HCC): Primary | ICD-10-CM

## 2025-07-14 DIAGNOSIS — E78.00 HYPERCHOLESTEROLEMIA: ICD-10-CM

## 2025-07-14 DIAGNOSIS — K21.9 GASTROESOPHAGEAL REFLUX DISEASE WITHOUT ESOPHAGITIS: ICD-10-CM

## 2025-07-14 DIAGNOSIS — J45.40 MODERATE PERSISTENT ASTHMA NOT DEPENDENT ON SYSTEMIC STEROIDS: ICD-10-CM

## 2025-07-14 DIAGNOSIS — Z12.31 ENCOUNTER FOR SCREENING MAMMOGRAM FOR BREAST CANCER: ICD-10-CM

## 2025-07-14 DIAGNOSIS — E03.9 ACQUIRED HYPOTHYROIDISM: ICD-10-CM

## 2025-07-14 PROCEDURE — 1123F ACP DISCUSS/DSCN MKR DOCD: CPT | Performed by: INTERNAL MEDICINE

## 2025-07-14 PROCEDURE — 1090F PRES/ABSN URINE INCON ASSESS: CPT | Performed by: INTERNAL MEDICINE

## 2025-07-14 PROCEDURE — 3017F COLORECTAL CA SCREEN DOC REV: CPT | Performed by: INTERNAL MEDICINE

## 2025-07-14 PROCEDURE — 1036F TOBACCO NON-USER: CPT | Performed by: INTERNAL MEDICINE

## 2025-07-14 PROCEDURE — 99214 OFFICE O/P EST MOD 30 MIN: CPT | Performed by: INTERNAL MEDICINE

## 2025-07-14 PROCEDURE — G8400 PT W/DXA NO RESULTS DOC: HCPCS | Performed by: INTERNAL MEDICINE

## 2025-07-14 PROCEDURE — 1160F RVW MEDS BY RX/DR IN RCRD: CPT | Performed by: INTERNAL MEDICINE

## 2025-07-14 PROCEDURE — G8417 CALC BMI ABV UP PARAM F/U: HCPCS | Performed by: INTERNAL MEDICINE

## 2025-07-14 PROCEDURE — 1159F MED LIST DOCD IN RCRD: CPT | Performed by: INTERNAL MEDICINE

## 2025-07-14 PROCEDURE — G8427 DOCREV CUR MEDS BY ELIG CLIN: HCPCS | Performed by: INTERNAL MEDICINE

## 2025-07-14 RX ORDER — MECOBALAMIN 5000 MCG
15 TABLET,DISINTEGRATING ORAL DAILY
Qty: 90 CAPSULE | Refills: 1 | Status: SHIPPED | OUTPATIENT
Start: 2025-07-14

## 2025-07-14 NOTE — PROGRESS NOTES
abdominal pain, constipation, diarrhea, nausea and vomiting.        Colonoscopy 7/27/23 with Dr. Weinstein:    1.  Diverticulosis  2.  Hemorrhoids     PLAN:   1.  Surveillance colonoscopy in 10 years, health permitting        Endocrine: Positive for polyuria. Negative for cold intolerance and heat intolerance.   Genitourinary:  Negative for difficulty urinating and menstrual problem.   Musculoskeletal: Negative.  Negative for arthralgias, myalgias and neck pain.   Skin:  Negative for rash.   Allergic/Immunologic: Negative.         Dog Dander IgE  0.00 - 0.34 kU/L 2.10 High   Sheep Sorrel IgE  0.00 - 0.34 kU/L 0.48 High   Comment: Performed at [facility], [address] [place], [place] [id number]  [phone].  IgE  <101 IU/mL 364 High        Component  Ref Range & Units 10/25/23 1548  Alternaria Alternata  0.00 - 0.34 kU/L <0.10  Comment:  ALLERGEN, INTERP, IMMUNOCAP SCORE IGE  <0.10            Class 0                      No significant                      level detected  0.10-0.34        Class 0/1                      Clinical relevance                      undertermined  0.35 to 0.70     Class 1  Low  0.71 to 3.50     Class 2  Moderate  3.51 to 17.50    Class 3  High  17.51 to 50.00   Class 4  Very High  50.01 to 100.00  Class 5  Very High  >100.00          Class 6  Very High  units: kU/L    Increasing ranges are reflective of increasing concentrations of  allergen specific IgE.  These concentrations may not correlate with the  degree of clinical response or skin testing results when challenged  with a specific allergen.  The correlation of allergy laboratory results with the clinical history  and in vivo reactivity to specific allergens is essential.  A negative  test may not rule out clinical allergy or even anaphylaxis.  Maple/Boxelder Tree  0.00 - 0.34 kU/L 0.37 High   Cat Dander Antibody  0.00 - 0.34 kU/L 0.40 High   [place] Tree  0.00 - 0.34 kU/L 0.25  [place] Tree  0.00 - 0.34 kU/L 0.32  ALLERGEN

## 2025-07-21 PROBLEM — J45.41 MODERATE PERSISTENT ASTHMA WITH ACUTE EXACERBATION: Status: RESOLVED | Noted: 2023-05-09 | Resolved: 2025-07-21

## 2025-07-21 PROBLEM — J45.40: Status: ACTIVE | Noted: 2025-07-21

## 2025-07-21 ASSESSMENT — ENCOUNTER SYMPTOMS
VOMITING: 0
COUGH: 0
RHINORRHEA: 0
DIARRHEA: 0
ABDOMINAL PAIN: 0
EYE PAIN: 0
SHORTNESS OF BREATH: 0
TROUBLE SWALLOWING: 0
ALLERGIC/IMMUNOLOGIC NEGATIVE: 1
CHOKING: 0
WHEEZING: 0
CONSTIPATION: 0
NAUSEA: 0
EYES NEGATIVE: 1

## 2025-07-30 ENCOUNTER — HOSPITAL ENCOUNTER (OUTPATIENT)
Dept: WOMENS IMAGING | Age: 75
Discharge: HOME | End: 2025-07-30
Attending: INTERNAL MEDICINE
Payer: MEDICARE

## 2025-07-30 VITALS — WEIGHT: 150 LBS | HEIGHT: 61 IN | BODY MASS INDEX: 28.32 KG/M2

## 2025-07-30 DIAGNOSIS — Z12.31 ENCOUNTER FOR SCREENING MAMMOGRAM FOR BREAST CANCER: ICD-10-CM

## 2025-07-30 PROCEDURE — 77063 BREAST TOMOSYNTHESIS BI: CPT

## 2025-08-27 ENCOUNTER — HOSPITAL ENCOUNTER (OUTPATIENT)
Dept: WOMENS IMAGING | Age: 75
Discharge: HOME | End: 2025-08-27
Attending: INTERNAL MEDICINE
Payer: MEDICARE

## 2025-08-27 ENCOUNTER — APPOINTMENT (OUTPATIENT)
Dept: ULTRASOUND IMAGING | Age: 75
End: 2025-08-27
Attending: INTERNAL MEDICINE
Payer: MEDICARE

## 2025-08-27 DIAGNOSIS — R92.8 ABNORMAL MAMMOGRAM OF RIGHT BREAST: ICD-10-CM

## 2025-08-27 PROCEDURE — G0279 TOMOSYNTHESIS, MAMMO: HCPCS

## 2025-08-28 DIAGNOSIS — F51.01 PRIMARY INSOMNIA: ICD-10-CM

## 2025-08-29 RX ORDER — HYDROXYZINE PAMOATE 25 MG/1
25 CAPSULE ORAL 4 TIMES DAILY PRN
Qty: 120 CAPSULE | Refills: 0 | Status: SHIPPED
Start: 2025-08-29 | End: 2025-08-29

## 2025-08-29 RX ORDER — HYDROXYZINE PAMOATE 25 MG/1
25 CAPSULE ORAL 4 TIMES DAILY PRN
Qty: 120 CAPSULE | Refills: 0 | Status: SHIPPED | OUTPATIENT
Start: 2025-08-29

## (undated) DEVICE — FORCEPS BX 240CM 2.4MM L NDL RAD JAW 4 M00513334